# Patient Record
Sex: FEMALE | Race: WHITE | NOT HISPANIC OR LATINO | Employment: UNEMPLOYED | URBAN - METROPOLITAN AREA
[De-identification: names, ages, dates, MRNs, and addresses within clinical notes are randomized per-mention and may not be internally consistent; named-entity substitution may affect disease eponyms.]

---

## 2020-12-02 ENCOUNTER — OFFICE VISIT (OUTPATIENT)
Dept: OBSTETRICS AND GYNECOLOGY | Facility: CLINIC | Age: 35
End: 2020-12-02
Payer: OTHER GOVERNMENT

## 2020-12-02 ENCOUNTER — PATIENT MESSAGE (OUTPATIENT)
Dept: OBSTETRICS AND GYNECOLOGY | Facility: CLINIC | Age: 35
End: 2020-12-02

## 2020-12-02 ENCOUNTER — LAB VISIT (OUTPATIENT)
Dept: LAB | Facility: HOSPITAL | Age: 35
End: 2020-12-02
Attending: OBSTETRICS & GYNECOLOGY
Payer: OTHER GOVERNMENT

## 2020-12-02 VITALS
WEIGHT: 186.06 LBS | SYSTOLIC BLOOD PRESSURE: 100 MMHG | DIASTOLIC BLOOD PRESSURE: 68 MMHG | BODY MASS INDEX: 31 KG/M2 | HEIGHT: 65 IN

## 2020-12-02 DIAGNOSIS — N96 HISTORY OF RECURRENT MISCARRIAGES: ICD-10-CM

## 2020-12-02 DIAGNOSIS — Z32.00 POSSIBLE PREGNANCY: ICD-10-CM

## 2020-12-02 DIAGNOSIS — Z32.00 POSSIBLE PREGNANCY: Primary | ICD-10-CM

## 2020-12-02 LAB
ABO + RH BLD: NORMAL
BASOPHILS # BLD AUTO: 0.06 K/UL (ref 0–0.2)
BASOPHILS NFR BLD: 0.9 % (ref 0–1.9)
BLD GP AB SCN CELLS X3 SERPL QL: NORMAL
DIFFERENTIAL METHOD: NORMAL
EOSINOPHIL # BLD AUTO: 0.2 K/UL (ref 0–0.5)
EOSINOPHIL NFR BLD: 2.7 % (ref 0–8)
ERYTHROCYTE [DISTWIDTH] IN BLOOD BY AUTOMATED COUNT: 12 % (ref 11.5–14.5)
HCG INTACT+B SERPL-ACNC: 2.5 MIU/ML
HCT VFR BLD AUTO: 41 % (ref 37–48.5)
HGB BLD-MCNC: 13.4 G/DL (ref 12–16)
IMM GRANULOCYTES # BLD AUTO: 0.03 K/UL (ref 0–0.04)
IMM GRANULOCYTES NFR BLD AUTO: 0.5 % (ref 0–0.5)
LYMPHOCYTES # BLD AUTO: 1.6 K/UL (ref 1–4.8)
LYMPHOCYTES NFR BLD: 24.7 % (ref 18–48)
MCH RBC QN AUTO: 29.3 PG (ref 27–31)
MCHC RBC AUTO-ENTMCNC: 32.7 G/DL (ref 32–36)
MCV RBC AUTO: 90 FL (ref 82–98)
MONOCYTES # BLD AUTO: 0.4 K/UL (ref 0.3–1)
MONOCYTES NFR BLD: 6.9 % (ref 4–15)
NEUTROPHILS # BLD AUTO: 4.1 K/UL (ref 1.8–7.7)
NEUTROPHILS NFR BLD: 64.3 % (ref 38–73)
NRBC BLD-RTO: 0 /100 WBC
PLATELET # BLD AUTO: 219 K/UL (ref 150–350)
PMV BLD AUTO: 9.4 FL (ref 9.2–12.9)
RBC # BLD AUTO: 4.58 M/UL (ref 4–5.4)
WBC # BLD AUTO: 6.4 K/UL (ref 3.9–12.7)

## 2020-12-02 PROCEDURE — 36415 COLL VENOUS BLD VENIPUNCTURE: CPT

## 2020-12-02 PROCEDURE — 86901 BLOOD TYPING SEROLOGIC RH(D): CPT

## 2020-12-02 PROCEDURE — 85025 COMPLETE CBC W/AUTO DIFF WBC: CPT

## 2020-12-02 PROCEDURE — 99203 PR OFFICE/OUTPT VISIT, NEW, LEVL III, 30-44 MIN: ICD-10-PCS | Mod: S$PBB,,, | Performed by: OBSTETRICS & GYNECOLOGY

## 2020-12-02 PROCEDURE — 99203 OFFICE O/P NEW LOW 30 MIN: CPT | Mod: PBBFAC,25 | Performed by: OBSTETRICS & GYNECOLOGY

## 2020-12-02 PROCEDURE — 99999 PR PBB SHADOW E&M-NEW PATIENT-LVL III: CPT | Mod: PBBFAC,,, | Performed by: OBSTETRICS & GYNECOLOGY

## 2020-12-02 PROCEDURE — 99999 PR PBB SHADOW E&M-NEW PATIENT-LVL III: ICD-10-PCS | Mod: PBBFAC,,, | Performed by: OBSTETRICS & GYNECOLOGY

## 2020-12-02 PROCEDURE — 99203 OFFICE O/P NEW LOW 30 MIN: CPT | Mod: S$PBB,,, | Performed by: OBSTETRICS & GYNECOLOGY

## 2020-12-02 PROCEDURE — 84702 CHORIONIC GONADOTROPIN TEST: CPT

## 2020-12-02 RX ORDER — LEVOCETIRIZINE DIHYDROCHLORIDE 5 MG/1
TABLET, FILM COATED ORAL
COMMUNITY
Start: 2020-11-02 | End: 2023-01-11

## 2020-12-02 NOTE — PROGRESS NOTES
Subjective:       Patient ID: Columba Romano is a 35 y.o. female.    Chief Complaint:  Amenorrhea      History of Present Illness  HPI  Possible Pregnancy  Patient complains of recent menses that was much heavier and crampier than usual.  Took a pregnancy test on Thanksgiving day that returned with a positive result. Repeated the test yesterday and still had a faint positive result.  She believes she could be suffering another miscarriage.  Pt reports a history of seven prior first trimester SAB.  First two occurred with her first .  Last 5 have occurred with her current .  Had one successful and uneventful full term pregnancy in  with current .  First SAB was confirmed by MD, however, pt did not seek medical care with any of the other SAB.  Pt reports that she was confident that she was having an SAB due to symptoms and seeing positive home UPT each time.  Never saw the need to seek MD assistance.   Pregnancy is desired. Sexual Activity: single partner, contraception: none.  Pt reports no prior evaluation for the multiple SAB.  Last pap NILM in  or 2020 ([place]).  Menses are regular.  Denies excessive bleeding or cramping.      GYN & OB History  Patient's last menstrual period was 10/26/2020.   Date of Last Pap: No result found    OB History    Para Term  AB Living   8 1 1   7 1   SAB TAB Ectopic Multiple Live Births   7       1      # Outcome Date GA Lbr Juanpablo/2nd Weight Sex Delivery Anes PTL Lv   8 Term 14 40w0d  3.402 kg (7 lb 8 oz) F Vag-Spont   ARELI   7 SAB            6 SAB            5 SAB            4 SAB            3 SAB            2 SAB            1 SAB                Review of Systems  Review of Systems   Constitutional: Negative for activity change, appetite change, chills, fatigue, fever and unexpected weight change.   Respiratory: Negative for shortness of breath.    Cardiovascular: Negative for chest pain, palpitations and leg swelling.    Gastrointestinal: Positive for abdominal pain. Negative for bloating, blood in stool, constipation, diarrhea, nausea and vomiting.   Genitourinary: Positive for menstrual problem and vaginal bleeding. Negative for dysmenorrhea, dyspareunia, dysuria, flank pain, frequency, genital sores, hematuria, menorrhagia, pelvic pain, urgency, vaginal discharge, vaginal pain, urinary incontinence, postcoital bleeding, vaginal dryness and vaginal odor.   Musculoskeletal: Negative for back pain.   Neurological: Negative for syncope and headaches.           Objective:    Physical Exam:   Constitutional: She is oriented to person, place, and time. She appears well-developed and well-nourished. No distress.    HENT:   Head: Normocephalic and atraumatic.    Eyes: Pupils are equal, round, and reactive to light. EOM are normal.    Neck: Normal range of motion.    Cardiovascular: Normal rate and regular rhythm.     Pulmonary/Chest: Effort normal.        Abdominal: Soft. She exhibits no distension. There is no abdominal tenderness.     Genitourinary:    Uterus normal.      Pelvic exam was performed with patient supine.   There is no rash, tenderness, lesion or injury on the right labia. There is no rash, tenderness, lesion or injury on the left labia. Uterus is not deviated, not enlarged and not tender. Cervix is normal. Right adnexum displays no mass, no tenderness and no fullness. Left adnexum displays tenderness. Left adnexum displays no mass and no fullness. There is bleeding (scant amount old blood in vault) in the vagina. No erythema or tenderness in the vagina.    No foreign body in the vagina.      No signs of injury in the vagina.   Cervix exhibits no motion tenderness, no discharge and no friability.    Genitourinary Comments: UPT today Negative   negative for vaginal discharge          Musculoskeletal: Normal range of motion and moves all extremeties. No tenderness or edema.       Neurological: She is alert and oriented to  person, place, and time.    Skin: Skin is warm and dry.    Psychiatric: She has a normal mood and affect. Her behavior is normal. Thought content normal.          Assessment:        1. Possible pregnancy    2. History of recurrent miscarriages             Plan:      Possible pregnancy  -     POCT urine pregnancy  -     HCG, Quantitative; Future; Expected date: 12/02/2020  -     CBC Auto Differential; Future; Expected date: 12/02/2020  -     Type & Screen; Future  -     UPT today was negative.  Pt was counseled on different possibilities including possibility of false positive/negative results.  Thus, not able to confirm pregnancy or SAB at this time.  Will await serum HCG levels for confirmation.    History of recurrent miscarriages  -     First SAB has been the only verified SAB that pt had.  The remaining 6 SAB that pt reports are based off pt recollection of symptoms and home UPT results.  Pt was counseled on evaluation options.  Will await results of current episode and will consider initiating full evaluation once current episode has been cleared.      Follow up in about 6 weeks (around 1/13/2021).

## 2021-05-10 ENCOUNTER — PATIENT MESSAGE (OUTPATIENT)
Dept: RESEARCH | Facility: HOSPITAL | Age: 36
End: 2021-05-10

## 2021-12-20 ENCOUNTER — OFFICE VISIT (OUTPATIENT)
Dept: INTERNAL MEDICINE | Facility: CLINIC | Age: 36
End: 2021-12-20
Payer: OTHER GOVERNMENT

## 2021-12-20 ENCOUNTER — HOSPITAL ENCOUNTER (OUTPATIENT)
Dept: RADIOLOGY | Facility: HOSPITAL | Age: 36
Discharge: HOME | End: 2021-12-20
Attending: FAMILY MEDICINE
Payer: OTHER GOVERNMENT

## 2021-12-20 VITALS
HEART RATE: 81 BPM | HEIGHT: 65 IN | BODY MASS INDEX: 31.96 KG/M2 | SYSTOLIC BLOOD PRESSURE: 116 MMHG | RESPIRATION RATE: 18 BRPM | WEIGHT: 191.81 LBS | DIASTOLIC BLOOD PRESSURE: 72 MMHG | OXYGEN SATURATION: 98 %

## 2021-12-20 DIAGNOSIS — E66.9 OBESITY (BMI 30.0-34.9): ICD-10-CM

## 2021-12-20 DIAGNOSIS — K91.5 POST-CHOLECYSTECTOMY SYNDROME: ICD-10-CM

## 2021-12-20 DIAGNOSIS — R04.2 COUGH WITH HEMOPTYSIS: ICD-10-CM

## 2021-12-20 DIAGNOSIS — G93.31 POST-INFLUENZA SYNDROME: ICD-10-CM

## 2021-12-20 DIAGNOSIS — K21.9 GASTROESOPHAGEAL REFLUX DISEASE, UNSPECIFIED WHETHER ESOPHAGITIS PRESENT: ICD-10-CM

## 2021-12-20 DIAGNOSIS — G93.31 POST-INFLUENZA SYNDROME: Primary | ICD-10-CM

## 2021-12-20 PROBLEM — I35.1 AORTIC VALVE REGURGITATION: Status: ACTIVE | Noted: 2017-08-10

## 2021-12-20 PROBLEM — N60.19 FIBROCYSTIC DISEASE OF BREAST: Status: ACTIVE | Noted: 2017-06-01

## 2021-12-20 PROBLEM — Q23.81 BICUSPID AORTIC VALVE: Status: ACTIVE | Noted: 2017-06-01

## 2021-12-20 PROBLEM — M50.30 DDD (DEGENERATIVE DISC DISEASE), CERVICAL: Status: ACTIVE | Noted: 2017-06-09

## 2021-12-20 PROBLEM — E66.811 OBESITY (BMI 30.0-34.9): Status: ACTIVE | Noted: 2021-12-20

## 2021-12-20 PROBLEM — J30.2 SEASONAL ALLERGIC RHINITIS: Status: ACTIVE | Noted: 2017-06-01

## 2021-12-20 PROBLEM — Q23.1 BICUSPID AORTIC VALVE: Status: ACTIVE | Noted: 2017-06-01

## 2021-12-20 PROBLEM — N28.1 SIMPLE RENAL CYST: Status: ACTIVE | Noted: 2018-01-19

## 2021-12-20 PROBLEM — G43.109 MIGRAINE WITH AURA: Status: ACTIVE | Noted: 2018-12-06

## 2021-12-20 LAB
BASOPHILS # BLD AUTO: 0.05 K/UL (ref 0–0.2)
BASOPHILS NFR BLD: 0.7 % (ref 0–1.9)
DIFFERENTIAL METHOD: ABNORMAL
EOSINOPHIL # BLD AUTO: 0.3 K/UL (ref 0–0.5)
EOSINOPHIL NFR BLD: 3.5 % (ref 0–8)
ERYTHROCYTE [DISTWIDTH] IN BLOOD BY AUTOMATED COUNT: 12.4 % (ref 11.5–14.5)
HCT VFR BLD AUTO: 42.4 % (ref 37–48.5)
HGB BLD-MCNC: 12.9 G/DL (ref 12–16)
IMM GRANULOCYTES # BLD AUTO: 0.04 K/UL (ref 0–0.04)
IMM GRANULOCYTES NFR BLD AUTO: 0.6 % (ref 0–0.5)
LYMPHOCYTES # BLD AUTO: 1.6 K/UL (ref 1–4.8)
LYMPHOCYTES NFR BLD: 22.8 % (ref 18–48)
MCH RBC QN AUTO: 28.6 PG (ref 27–31)
MCHC RBC AUTO-ENTMCNC: 30.4 G/DL (ref 32–36)
MCV RBC AUTO: 94 FL (ref 82–98)
MONOCYTES # BLD AUTO: 0.7 K/UL (ref 0.3–1)
MONOCYTES NFR BLD: 9.8 % (ref 4–15)
NEUTROPHILS # BLD AUTO: 4.5 K/UL (ref 1.8–7.7)
NEUTROPHILS NFR BLD: 62.6 % (ref 38–73)
NRBC BLD-RTO: 0 /100 WBC
PLATELET # BLD AUTO: 236 K/UL (ref 150–450)
PMV BLD AUTO: 11.1 FL (ref 9.2–12.9)
RBC # BLD AUTO: 4.51 M/UL (ref 4–5.4)
WBC # BLD AUTO: 7.12 K/UL (ref 3.9–12.7)

## 2021-12-20 PROCEDURE — 85025 COMPLETE CBC W/AUTO DIFF WBC: CPT | Performed by: FAMILY MEDICINE

## 2021-12-20 PROCEDURE — 99203 OFFICE O/P NEW LOW 30 MIN: CPT | Mod: S$PBB,,, | Performed by: FAMILY MEDICINE

## 2021-12-20 PROCEDURE — 71046 X-RAY EXAM CHEST 2 VIEWS: CPT | Mod: TC

## 2021-12-20 PROCEDURE — 99999 PR PBB SHADOW E&M-EST. PATIENT-LVL IV: CPT | Mod: PBBFAC,,, | Performed by: FAMILY MEDICINE

## 2021-12-20 PROCEDURE — 99214 OFFICE O/P EST MOD 30 MIN: CPT | Mod: PBBFAC,25 | Performed by: FAMILY MEDICINE

## 2021-12-20 PROCEDURE — 99999 PR PBB SHADOW E&M-EST. PATIENT-LVL IV: ICD-10-PCS | Mod: PBBFAC,,, | Performed by: FAMILY MEDICINE

## 2021-12-20 PROCEDURE — 71046 X-RAY EXAM CHEST 2 VIEWS: CPT | Mod: 26,,, | Performed by: RADIOLOGY

## 2021-12-20 PROCEDURE — 71046 XR CHEST PA AND LATERAL: ICD-10-PCS | Mod: 26,,, | Performed by: RADIOLOGY

## 2021-12-20 PROCEDURE — 99203 PR OFFICE/OUTPT VISIT, NEW, LEVL III, 30-44 MIN: ICD-10-PCS | Mod: S$PBB,,, | Performed by: FAMILY MEDICINE

## 2021-12-20 RX ORDER — AMOXICILLIN 875 MG/1
TABLET, FILM COATED ORAL
COMMUNITY
Start: 2021-07-03 | End: 2021-12-20

## 2021-12-20 RX ORDER — HYDROCODONE BITARTRATE AND ACETAMINOPHEN 7.5; 325 MG/1; MG/1
1 TABLET ORAL
COMMUNITY
Start: 2021-07-05 | End: 2021-12-20

## 2021-12-20 RX ORDER — DIPHENOXYLATE HYDROCHLORIDE AND ATROPINE SULFATE 2.5; .025 MG/1; MG/1
1 TABLET ORAL 4 TIMES DAILY
COMMUNITY
Start: 2021-08-26 | End: 2022-01-13

## 2021-12-20 RX ORDER — ALBUTEROL SULFATE 90 UG/1
2 AEROSOL, METERED RESPIRATORY (INHALATION) EVERY 6 HOURS PRN
Qty: 18 G | Refills: 0 | Status: SHIPPED | OUTPATIENT
Start: 2021-12-20 | End: 2022-01-15

## 2021-12-20 RX ORDER — BALOXAVIR MARBOXIL 80 MG/1
TABLET, FILM COATED ORAL
COMMUNITY
Start: 2021-12-09 | End: 2021-12-20 | Stop reason: ALTCHOICE

## 2021-12-20 RX ORDER — BENZONATATE 200 MG/1
200 CAPSULE ORAL 3 TIMES DAILY PRN
Qty: 30 CAPSULE | Refills: 0 | Status: SHIPPED | OUTPATIENT
Start: 2021-12-20 | End: 2021-12-30

## 2021-12-20 RX ORDER — FAMOTIDINE 40 MG/1
40 TABLET, FILM COATED ORAL DAILY
COMMUNITY
End: 2022-01-13 | Stop reason: ALTCHOICE

## 2021-12-20 RX ORDER — TRAMADOL HYDROCHLORIDE 50 MG/1
50 TABLET ORAL EVERY 6 HOURS PRN
COMMUNITY
Start: 2021-07-03 | End: 2021-12-20

## 2021-12-20 RX ORDER — PREDNISONE 20 MG/1
40 TABLET ORAL DAILY
COMMUNITY
Start: 2021-12-09 | End: 2021-12-20

## 2022-01-11 DIAGNOSIS — R04.2 COUGH WITH HEMOPTYSIS: ICD-10-CM

## 2022-01-11 NOTE — TELEPHONE ENCOUNTER
No new care gaps identified.  Powered by [employer]. Reference number: [id number].   1/11/2022 3:41:28 AM CST

## 2022-01-13 ENCOUNTER — OFFICE VISIT (OUTPATIENT)
Dept: INTERNAL MEDICINE | Facility: CLINIC | Age: 37
End: 2022-01-13
Payer: OTHER GOVERNMENT

## 2022-01-13 VITALS
DIASTOLIC BLOOD PRESSURE: 70 MMHG | OXYGEN SATURATION: 98 % | TEMPERATURE: 100 F | WEIGHT: 193.56 LBS | HEART RATE: 96 BPM | SYSTOLIC BLOOD PRESSURE: 112 MMHG | BODY MASS INDEX: 32.21 KG/M2

## 2022-01-13 DIAGNOSIS — Q23.1 BICUSPID AORTIC VALVE: ICD-10-CM

## 2022-01-13 DIAGNOSIS — I35.1 AORTIC VALVE INSUFFICIENCY, ETIOLOGY OF CARDIAC VALVE DISEASE UNSPECIFIED: ICD-10-CM

## 2022-01-13 DIAGNOSIS — E66.9 OBESITY (BMI 30.0-34.9): ICD-10-CM

## 2022-01-13 DIAGNOSIS — Z01.419 WELL WOMAN EXAM: ICD-10-CM

## 2022-01-13 DIAGNOSIS — K21.9 GASTROESOPHAGEAL REFLUX DISEASE, UNSPECIFIED WHETHER ESOPHAGITIS PRESENT: ICD-10-CM

## 2022-01-13 DIAGNOSIS — G43.119 INTRACTABLE MIGRAINE WITH AURA WITHOUT STATUS MIGRAINOSUS: ICD-10-CM

## 2022-01-13 DIAGNOSIS — Z00.00 ROUTINE GENERAL MEDICAL EXAMINATION AT A HEALTH CARE FACILITY: Primary | ICD-10-CM

## 2022-01-13 DIAGNOSIS — J30.1 SEASONAL ALLERGIC RHINITIS DUE TO POLLEN: ICD-10-CM

## 2022-01-13 DIAGNOSIS — R25.3 FREQUENT FASCICULATION OF MUSCLE OF EXTREMITY: ICD-10-CM

## 2022-01-13 LAB
ALBUMIN SERPL BCP-MCNC: 3.9 G/DL (ref 3.5–5.2)
ALP SERPL-CCNC: 78 U/L (ref 55–135)
ALT SERPL W/O P-5'-P-CCNC: 41 U/L (ref 10–44)
ANION GAP SERPL CALC-SCNC: 11 MMOL/L (ref 8–16)
AST SERPL-CCNC: 32 U/L (ref 10–40)
BILIRUB SERPL-MCNC: 0.3 MG/DL (ref 0.1–1)
BILIRUB UR QL STRIP: NEGATIVE
BUN SERPL-MCNC: 10 MG/DL (ref 6–20)
CALCIUM SERPL-MCNC: 9.5 MG/DL (ref 8.7–10.5)
CHLORIDE SERPL-SCNC: 106 MMOL/L (ref 95–110)
CHOLEST SERPL-MCNC: 211 MG/DL (ref 120–199)
CHOLEST/HDLC SERPL: 4.4 {RATIO} (ref 2–5)
CK SERPL-CCNC: 98 U/L (ref 20–180)
CLARITY UR: CLEAR
CO2 SERPL-SCNC: 21 MMOL/L (ref 23–29)
COLOR UR: YELLOW
CREAT SERPL-MCNC: 0.7 MG/DL (ref 0.5–1.4)
EST. GFR  (AFRICAN AMERICAN): >60 ML/MIN/1.73 M^2
EST. GFR  (NON AFRICAN AMERICAN): >60 ML/MIN/1.73 M^2
GLUCOSE SERPL-MCNC: 79 MG/DL (ref 70–110)
GLUCOSE UR QL STRIP: NEGATIVE
HDLC SERPL-MCNC: 48 MG/DL (ref 40–75)
HDLC SERPL: 22.7 % (ref 20–50)
HGB UR QL STRIP: NEGATIVE
KETONES UR QL STRIP: NEGATIVE
LDLC SERPL CALC-MCNC: 88.6 MG/DL (ref 63–159)
LEUKOCYTE ESTERASE UR QL STRIP: NEGATIVE
NITRITE UR QL STRIP: NEGATIVE
NONHDLC SERPL-MCNC: 163 MG/DL
PH UR STRIP: 7 [PH] (ref 5–8)
POTASSIUM SERPL-SCNC: 3.8 MMOL/L (ref 3.5–5.1)
PROT SERPL-MCNC: 6.9 G/DL (ref 6–8.4)
PROT UR QL STRIP: NEGATIVE
SODIUM SERPL-SCNC: 138 MMOL/L (ref 136–145)
SP GR UR STRIP: 1.02 (ref 1–1.03)
TRIGL SERPL-MCNC: 372 MG/DL (ref 30–150)
TSH SERPL DL<=0.005 MIU/L-ACNC: 1 UIU/ML (ref 0.4–4)
URN SPEC COLLECT METH UR: NORMAL

## 2022-01-13 PROCEDURE — 87389 HIV-1 AG W/HIV-1&-2 AB AG IA: CPT | Performed by: FAMILY MEDICINE

## 2022-01-13 PROCEDURE — 99395 PR PREVENTIVE VISIT,EST,18-39: ICD-10-PCS | Mod: S$PBB,,, | Performed by: FAMILY MEDICINE

## 2022-01-13 PROCEDURE — 80061 LIPID PANEL: CPT | Performed by: FAMILY MEDICINE

## 2022-01-13 PROCEDURE — 84443 ASSAY THYROID STIM HORMONE: CPT | Performed by: FAMILY MEDICINE

## 2022-01-13 PROCEDURE — 99999 PR PBB SHADOW E&M-EST. PATIENT-LVL V: CPT | Mod: PBBFAC,,, | Performed by: FAMILY MEDICINE

## 2022-01-13 PROCEDURE — 81003 URINALYSIS AUTO W/O SCOPE: CPT | Performed by: FAMILY MEDICINE

## 2022-01-13 PROCEDURE — 99215 OFFICE O/P EST HI 40 MIN: CPT | Mod: PBBFAC | Performed by: FAMILY MEDICINE

## 2022-01-13 PROCEDURE — 99395 PREV VISIT EST AGE 18-39: CPT | Mod: S$PBB,,, | Performed by: FAMILY MEDICINE

## 2022-01-13 PROCEDURE — 86803 HEPATITIS C AB TEST: CPT | Performed by: FAMILY MEDICINE

## 2022-01-13 PROCEDURE — 80053 COMPREHEN METABOLIC PANEL: CPT | Performed by: FAMILY MEDICINE

## 2022-01-13 PROCEDURE — 99999 PR PBB SHADOW E&M-EST. PATIENT-LVL V: ICD-10-PCS | Mod: PBBFAC,,, | Performed by: FAMILY MEDICINE

## 2022-01-13 PROCEDURE — 82550 ASSAY OF CK (CPK): CPT | Performed by: FAMILY MEDICINE

## 2022-01-13 PROCEDURE — 85025 COMPLETE CBC W/AUTO DIFF WBC: CPT | Performed by: FAMILY MEDICINE

## 2022-01-13 RX ORDER — OMEPRAZOLE 40 MG/1
40 CAPSULE, DELAYED RELEASE ORAL DAILY
Qty: 30 CAPSULE | Refills: 11 | Status: SHIPPED | OUTPATIENT
Start: 2022-01-13 | End: 2023-03-30

## 2022-01-13 NOTE — PROGRESS NOTES
Subjective:       Patient ID: Columba Romano is a 36 y.o. female.    Chief Complaint: ER follow-up, extremity problem, and vision changes    36-year-old female patient with Patient Active Problem List:     Gastroesophageal reflux disease     Post-cholecystectomy syndrome     Obesity (BMI 30.0-34.9)     Bicuspid aortic valve     Aortic valve regurgitation     DDD (degenerative disc disease), cervical     Fibrocystic disease of breast     Migraine with aura     Seasonal allergic rhinitis     Simple renal cyst  Here for routine annual physicals and reports that she went to ER at our [facility] secondary to chest pain and had complete evaluation secondary to mildly elevation D-dimer with CTA which was normal.  Patient reported that she was having chest pain with throat discomfort, and never had similar symptoms like this before.  Secondary to acid reflux has been taking famotidine 40 mg daily for the past 2 years.  Denies any abdominal discomfort nausea vomiting or chest tightness lately but would like to see a gastroenterologist for further evaluation.  Patient has moved from [place] and reported that she had EGD and colonoscopy done couple of years ago by her gastroenterologist was advised to repeat EGD again.   Patient has been having muscle tremors to both the lower legs off and on lately and secondary to unexpected falls, headache and vision disturbances had seen neurologist in [place] and had MRI of the brain which did not show any acute findings and reported that she had nerve conduction study done 5 years ago which was normal.  Continues to have muscle tremors to bilateral legs and reports recent fall.       Review of Systems   Constitutional: Negative for activity change and unexpected weight change.   HENT: Negative for hearing loss, rhinorrhea and trouble swallowing.    Eyes: Positive for visual disturbance. Negative for discharge.   Respiratory: Negative for chest tightness and wheezing.    Cardiovascular:  Negative for chest pain and palpitations.   Gastrointestinal: Negative for blood in stool, constipation, diarrhea and vomiting.   Endocrine: Negative for polydipsia and polyuria.   Genitourinary: Negative for difficulty urinating, dysuria, hematuria and menstrual problem.   Musculoskeletal: Positive for arthralgias and myalgias. Negative for joint swelling and neck pain.   Neurological: Positive for headaches. Negative for weakness.   Psychiatric/Behavioral: Negative for confusion and dysphoric mood.         /70 (BP Location: Left arm, Patient Position: Sitting, BP Method: Large (Manual))   Pulse 96   Temp 99.7 °F (37.6 °C) (Tympanic)   Wt 87.8 kg (193 lb 9 oz)   LMP 12/22/2021 (Exact Date)   SpO2 98%   BMI 32.21 kg/m²   Objective:      Physical Exam  Constitutional:       Appearance: She is well-developed and well-nourished.   HENT:      Head: Normocephalic and atraumatic.      Mouth/Throat:      Mouth: Oropharynx is clear and moist.   Cardiovascular:      Rate and Rhythm: Normal rate and regular rhythm.      Heart sounds: Normal heart sounds. No murmur heard.      Pulmonary:      Effort: Pulmonary effort is normal.      Breath sounds: Normal breath sounds. No wheezing.   Chest:      Chest wall: No tenderness.   Abdominal:      General: Bowel sounds are normal.      Palpations: Abdomen is soft.      Tenderness: There is no abdominal tenderness.   Musculoskeletal:         General: No tenderness or edema.   Skin:     General: Skin is warm and dry.      Findings: No rash.   Neurological:      Mental Status: She is alert and oriented to person, place, and time.   Psychiatric:         Mood and Affect: Mood and affect and mood normal.           Assessment/Plan:   1. Routine general medical examination at a health care facility  - CBC Auto Differential  - Comprehensive Metabolic Panel  - Lipid Panel  - TSH  - Urinalysis, Reflex to Urine Culture Urine, Clean Catch  - Hepatitis C Antibody  - HIV 1/2 Ag/Ab (4th  Gen)  Vital signs stable today.  Clinical exam stable  Continue lifestyle modifications with low-fat and low-cholesterol diet and exercise 30 minutes daily  Did not get flu shot this year    2. Gastroesophageal reflux disease, unspecified whether esophagitis present  - omeprazole (PRILOSEC) 40 MG capsule; Take 1 capsule (40 mg total) by mouth once daily.  Dispense: 30 capsule; Refill: 11  - Ambulatory referral/consult to Gastroenterology; Future  Will consider changing famotidine to omeprazole 40 mg and will refer to Gastroenterology  Reviewed ER workup through care everywhere which was normal  Encouraged to eat small frequent meals and avoid spicy diet    3. Bicuspid aortic valve  4. Aortic valve insufficiency, etiology of cardiac valve disease unspecified  Followed by Cardiology Dr. land    5. Intractable migraine with aura without status migrainosus    6. Seasonal allergic rhinitis due to pollen    7. Obesity (BMI 30.0-34.9)  Lifestyle modifications discussed to lose weight with BMI 32    8. Frequent fasciculation of muscle of extremity  - Ambulatory referral/consult to Neurology  - EMG W/ ULTRASOUND AND NERVE CONDUCTION TEST 2 Extremities; Future  - CK  Will get nerve conduction study and will refer to Neurology to rule out neuromuscular diseases    9. Well woman exam  - Ambulatory referral/consult to Obstetrics / Gynecology; Future   Due for Pap smear

## 2022-01-14 LAB
BASOPHILS # BLD AUTO: 0.06 K/UL (ref 0–0.2)
BASOPHILS NFR BLD: 0.9 % (ref 0–1.9)
DIFFERENTIAL METHOD: ABNORMAL
EOSINOPHIL # BLD AUTO: 0.2 K/UL (ref 0–0.5)
EOSINOPHIL NFR BLD: 2.6 % (ref 0–8)
ERYTHROCYTE [DISTWIDTH] IN BLOOD BY AUTOMATED COUNT: 12.7 % (ref 11.5–14.5)
HCT VFR BLD AUTO: 42.6 % (ref 37–48.5)
HCV AB SERPL QL IA: NEGATIVE
HGB BLD-MCNC: 13.4 G/DL (ref 12–16)
HIV 1+2 AB+HIV1 P24 AG SERPL QL IA: NEGATIVE
IMM GRANULOCYTES # BLD AUTO: 0.02 K/UL (ref 0–0.04)
IMM GRANULOCYTES NFR BLD AUTO: 0.3 % (ref 0–0.5)
LYMPHOCYTES # BLD AUTO: 1.9 K/UL (ref 1–4.8)
LYMPHOCYTES NFR BLD: 27.2 % (ref 18–48)
MCH RBC QN AUTO: 29.5 PG (ref 27–31)
MCHC RBC AUTO-ENTMCNC: 31.5 G/DL (ref 32–36)
MCV RBC AUTO: 94 FL (ref 82–98)
MONOCYTES # BLD AUTO: 0.6 K/UL (ref 0.3–1)
MONOCYTES NFR BLD: 8.1 % (ref 4–15)
NEUTROPHILS # BLD AUTO: 4.3 K/UL (ref 1.8–7.7)
NEUTROPHILS NFR BLD: 60.9 % (ref 38–73)
NRBC BLD-RTO: 0 /100 WBC
PLATELET # BLD AUTO: 250 K/UL (ref 150–450)
PMV BLD AUTO: 11.4 FL (ref 9.2–12.9)
RBC # BLD AUTO: 4.54 M/UL (ref 4–5.4)
WBC # BLD AUTO: 7.02 K/UL (ref 3.9–12.7)

## 2022-01-15 RX ORDER — ALBUTEROL SULFATE 90 UG/1
AEROSOL, METERED RESPIRATORY (INHALATION)
Qty: 54 G | Refills: 3 | Status: SHIPPED | OUTPATIENT
Start: 2022-01-15 | End: 2023-01-24

## 2022-01-16 NOTE — TELEPHONE ENCOUNTER
Refill Routing Note   Medication(s) are not appropriate for processing by [facility] for the following reason(s):      - Medication is a new start (<3 months)    [facility] action(s):  Defer          --->Care Gap information included in message below if applicable.   Medication reconciliation completed: No   Automatic Epic Generated Protocol Data:        Requested Prescriptions   Pending Prescriptions Disp Refills    albuterol (PROVENTIL/VENTOLIN HFA) 90 mcg/actuation inhaler [Pharmacy Med Name: ALBUTEROL HFA INH(200 PUFFS)18GM] 54 g 3     Sig: INHALE 2 PUFFS INTO THE LUNGS EVERY 6 HOURS AS NEEDED FOR WHEEZING( RESCUE)       Pulmonology:  Beta [facility]ists Passed - 1/11/2022  3:41 AM        Passed - Patient is at least 18 years old        Passed - Negative Pregnancy Status Check        Passed - Last Heart Rate in normal range within 360 days     Pulse Readings from Last 1 Encounters:   01/13/22 96              Passed - Valid encounter within last 15 months     Recent Visits  Date Type Provider Dept   01/13/22 Office Visit Joan Forrester MD [facility]   12/20/21 Office Visit Joan Forrester MD [facility]   Showing recent visits within past 720 days and meeting all other requirements  Future Appointments  No visits were found meeting these conditions.  Showing future appointments within next 150 days and meeting all other requirements                      Appointments  past 12m or future 3m with PCP    Date Provider   Last Visit   12/20/2021 Joan Forrester MD   Next Visit   1/13/2022 Joan Forrester MD   ED visits in past 90 days: 0        Note composed:8:00 PM 01/15/2022

## 2022-01-17 ENCOUNTER — PATIENT MESSAGE (OUTPATIENT)
Dept: INTERNAL MEDICINE | Facility: CLINIC | Age: 37
End: 2022-01-17
Payer: OTHER GOVERNMENT

## 2022-01-27 ENCOUNTER — TELEPHONE (OUTPATIENT)
Dept: OBSTETRICS AND GYNECOLOGY | Facility: CLINIC | Age: 37
End: 2022-01-27
Payer: OTHER GOVERNMENT

## 2022-03-10 ENCOUNTER — PATIENT OUTREACH (OUTPATIENT)
Dept: ADMINISTRATIVE | Facility: HOSPITAL | Age: 37
End: 2022-03-10
Payer: OTHER GOVERNMENT

## 2022-09-02 ENCOUNTER — PATIENT MESSAGE (OUTPATIENT)
Dept: INTERNAL MEDICINE | Facility: CLINIC | Age: 37
End: 2022-09-02
Payer: OTHER GOVERNMENT

## 2022-09-06 ENCOUNTER — OFFICE VISIT (OUTPATIENT)
Dept: INTERNAL MEDICINE | Facility: CLINIC | Age: 37
End: 2022-09-06
Payer: OTHER GOVERNMENT

## 2022-09-06 ENCOUNTER — OFFICE VISIT (OUTPATIENT)
Dept: OTOLARYNGOLOGY | Facility: CLINIC | Age: 37
End: 2022-09-06
Payer: OTHER GOVERNMENT

## 2022-09-06 VITALS — DIASTOLIC BLOOD PRESSURE: 78 MMHG | SYSTOLIC BLOOD PRESSURE: 119 MMHG | HEART RATE: 90 BPM | TEMPERATURE: 98 F

## 2022-09-06 DIAGNOSIS — J30.1 SEASONAL ALLERGIC RHINITIS DUE TO POLLEN: ICD-10-CM

## 2022-09-06 DIAGNOSIS — R49.0 HOARSENESS: ICD-10-CM

## 2022-09-06 DIAGNOSIS — R06.83 SNORING: Primary | ICD-10-CM

## 2022-09-06 DIAGNOSIS — H92.09 OTALGIA, UNSPECIFIED LATERALITY: Primary | ICD-10-CM

## 2022-09-06 DIAGNOSIS — R09.82 PND (POST-NASAL DRIP): ICD-10-CM

## 2022-09-06 PROCEDURE — 99999 PR PBB SHADOW E&M-EST. PATIENT-LVL III: ICD-10-PCS | Mod: PBBFAC,,, | Performed by: PHYSICIAN ASSISTANT

## 2022-09-06 PROCEDURE — 99213 OFFICE O/P EST LOW 20 MIN: CPT | Mod: 95,,, | Performed by: FAMILY MEDICINE

## 2022-09-06 PROCEDURE — 99203 OFFICE O/P NEW LOW 30 MIN: CPT | Mod: S$PBB,,, | Performed by: PHYSICIAN ASSISTANT

## 2022-09-06 PROCEDURE — 99213 PR OFFICE/OUTPT VISIT, EST, LEVL III, 20-29 MIN: ICD-10-PCS | Mod: 95,,, | Performed by: FAMILY MEDICINE

## 2022-09-06 PROCEDURE — 99999 PR PBB SHADOW E&M-EST. PATIENT-LVL III: CPT | Mod: PBBFAC,,, | Performed by: PHYSICIAN ASSISTANT

## 2022-09-06 PROCEDURE — 99213 OFFICE O/P EST LOW 20 MIN: CPT | Mod: PBBFAC | Performed by: PHYSICIAN ASSISTANT

## 2022-09-06 PROCEDURE — 99203 PR OFFICE/OUTPT VISIT, NEW, LEVL III, 30-44 MIN: ICD-10-PCS | Mod: S$PBB,,, | Performed by: PHYSICIAN ASSISTANT

## 2022-09-06 NOTE — PROGRESS NOTES
Subjective:   Patient ID: Columba Romano is a 37 y.o. female.    Chief Complaint: Other (Reflux, sinuses, ear pain, chronic cough X 1 year )    Ms. Romano is a very pleasant 38 yo femael here to see me today for multiple reasons. She moved from [place] 2 years ago and is in the process of establishing care with ENT, GI and Allergy. She has a h/o LRD and is managed with omeprazole. Since she has moved, she has been having constant allergy issues, nasal congestion,  PND, sore throat with frequent hoarseness. She is a smoker.     Review of patient's allergies indicates:   Allergen Reactions    Diphenhydramine (bulk) Other (See Comments)     Restless legs    Meperidine Itching and Anxiety           Review of Systems   Constitutional:  Negative for chills, fatigue, fever and unexpected weight change.   HENT:  Positive for congestion, ear pain, rhinorrhea, sore throat and voice change. Negative for dental problem, ear discharge, facial swelling, hearing loss, nosebleeds, postnasal drip, sinus pressure, sneezing, tinnitus and trouble swallowing.    Eyes:  Negative for redness, itching and visual disturbance.   Respiratory:  Negative for cough, choking, shortness of breath and wheezing.    Cardiovascular:  Negative for chest pain and palpitations.   Gastrointestinal:  Negative for abdominal pain.        No reflux.   Musculoskeletal:  Negative for gait problem.   Skin:  Negative for rash.   Allergic/Immunologic: Positive for environmental allergies.   Neurological:  Negative for dizziness, light-headedness and headaches.       Objective:   /78   Pulse 90   Temp 98.1 °F (36.7 °C) (Temporal)     Physical Exam  Constitutional:       General: She is not in acute distress.     Appearance: She is well-developed.   HENT:      Head: Normocephalic and atraumatic.      Right Ear: Tympanic membrane, ear canal and external ear normal.      Left Ear: Tympanic membrane, ear canal and external ear normal.      Nose: Nose normal.  No nasal deformity, septal deviation, mucosal edema or rhinorrhea.      Right Sinus: No maxillary sinus tenderness or frontal sinus tenderness.      Left Sinus: No maxillary sinus tenderness or frontal sinus tenderness.      Mouth/Throat:      Mouth: Mucous membranes are not pale and not dry.      Dentition: No dental caries.      Pharynx: Uvula midline. No oropharyngeal exudate or posterior oropharyngeal erythema.   Eyes:      General: Lids are normal. No scleral icterus.     Extraocular Movements:      Right eye: Normal extraocular motion and no nystagmus.      Left eye: Normal extraocular motion and no nystagmus.      Conjunctiva/sclera: Conjunctivae normal.      Right eye: Right conjunctiva is not injected. No chemosis.     Left eye: Left conjunctiva is not injected. No chemosis.     Pupils: Pupils are equal, round, and reactive to light.   Neck:      Thyroid: No thyroid mass or thyromegaly.      Trachea: Trachea and phonation normal. No tracheal tenderness or tracheal deviation.   Pulmonary:      Effort: Pulmonary effort is normal. No respiratory distress.      Breath sounds: No stridor.   Abdominal:      General: There is no distension.   Lymphadenopathy:      Head:      Right side of head: No submental, submandibular, preauricular, posterior auricular or occipital adenopathy.      Left side of head: No submental, submandibular, preauricular, posterior auricular or occipital adenopathy.      Cervical: No cervical adenopathy.   Skin:     General: Skin is warm and dry.      Findings: No erythema or rash.   Neurological:      Mental Status: She is alert and oriented to person, place, and time.      Cranial Nerves: No cranial nerve deficit.   Psychiatric:         Behavior: Behavior normal.          Assessment:     1. Otalgia, unspecified laterality    2. PND (post-nasal drip)    3. Hoarseness        Plan:     Otalgia, unspecified laterality    PND (post-nasal drip)    Hoarseness    Other orders  -     fluticasone  (VERAMYST) 27.5 mcg/actuation nasal spray; 2 sprays by Nasal route once daily.  Dispense: 9.1 mL; Refill: 6      We discussed restarting Flonase for PND and thought ETD. She will also continue frequent saline flushes. We have scheduled her to return to clinic for a scope evaluation. She has appointments already scheduled for Allergy as well as GI.

## 2022-09-06 NOTE — PROGRESS NOTES
The patient location is: Home  The chief complaint leading to consultation is: Snoring    Visit type: audiovisual    Face to Face time with patient:   15 minutes of total time spent on the encounter, which includes face to face time and non-face to face time preparing to see the patient (eg, review of tests), Obtaining and/or reviewing separately obtained history, Documenting clinical information in the electronic or other health record, Independently interpreting results (not separately reported) and communicating results to the patient/family/caregiver, or Care coordination (not separately reported).         Each patient to whom he or she provides medical services by telemedicine is:  (1) informed of the relationship between the physician and patient and the respective role of any other health care provider with respect to management of the patient; and (2) notified that he or she may decline to receive medical services by telemedicine and may withdraw from such care at any time.    Notes:      Subjective:       Patient ID: Columba Romano is a 37 y.o. female.    Chief Complaint: No chief complaint on file.    37-year-old female patient with Patient Active Problem List:     Gastroesophageal reflux disease     Post-cholecystectomy syndrome     Obesity (BMI 30.0-34.9)     Bicuspid aortic valve     Aortic valve regurgitation     DDD (degenerative disc disease), cervical     Fibrocystic disease of breast     Migraine with aura     Seasonal allergic rhinitis     Simple renal cyst  Reports that she has been snoring a lot lately and has been having early morning fatigue, patient had SVT in the past with Holter monitor and was advised by her cardiologist to get tested for sleep apnea in [place] before she moved here.   Patient would like to get further testing and reports that occasionally she has been having dry to productive cough and would like to see pulmonologist for further evaluation  Does suffer from allergies  for which patient had seen ENT today    Review of Systems   Constitutional:  Positive for fatigue. Negative for activity change and unexpected weight change.   HENT:  Negative for hearing loss, rhinorrhea and trouble swallowing.    Eyes:  Negative for discharge and visual disturbance.   Respiratory:  Positive for cough. Negative for chest tightness, shortness of breath and wheezing.    Cardiovascular:  Negative for chest pain, palpitations and leg swelling.   Gastrointestinal:  Negative for abdominal pain, blood in stool, constipation, diarrhea, nausea and vomiting.   Endocrine: Negative for polydipsia and polyuria.   Genitourinary:  Negative for difficulty urinating, dysuria, hematuria and menstrual problem.   Musculoskeletal:  Negative for arthralgias, joint swelling, myalgias and neck pain.   Skin:  Negative for rash.   Neurological:  Negative for weakness, light-headedness and headaches.   Psychiatric/Behavioral:  Positive for sleep disturbance. Negative for confusion and dysphoric mood.        There were no vitals taken for this visit.  Objective:      Physical Exam  Pulmonary:      Effort: No respiratory distress.   Neurological:      Mental Status: She is alert and oriented to person, place, and time.   Psychiatric:         Mood and Affect: Mood normal.         Assessment/Plan:   1. Snoring  - Ambulatory referral/consult to Pulmonology; Future  Will refer to pulmonology for further evaluation for sleep apnea as requested by patient     Seasonal allergies-  Patient was seen by ENT and was advised to continue using prescriptions as prescribed today

## 2022-09-12 ENCOUNTER — OFFICE VISIT (OUTPATIENT)
Dept: OTOLARYNGOLOGY | Facility: CLINIC | Age: 37
End: 2022-09-12
Payer: OTHER GOVERNMENT

## 2022-09-12 VITALS — HEART RATE: 78 BPM | TEMPERATURE: 99 F | DIASTOLIC BLOOD PRESSURE: 80 MMHG | SYSTOLIC BLOOD PRESSURE: 114 MMHG

## 2022-09-12 DIAGNOSIS — J34.2 NASAL SEPTAL DEVIATION: ICD-10-CM

## 2022-09-12 DIAGNOSIS — J34.3 HYPERTROPHY OF INFERIOR NASAL TURBINATE: ICD-10-CM

## 2022-09-12 DIAGNOSIS — K21.9 LARYNGOPHARYNGEAL REFLUX (LPR): Primary | ICD-10-CM

## 2022-09-12 DIAGNOSIS — M95.0 NASAL VALVE COLLAPSE: ICD-10-CM

## 2022-09-12 DIAGNOSIS — J30.89 NON-SEASONAL ALLERGIC RHINITIS, UNSPECIFIED TRIGGER: ICD-10-CM

## 2022-09-12 PROCEDURE — 31575 DIAGNOSTIC LARYNGOSCOPY: CPT | Mod: PBBFAC | Performed by: OTOLARYNGOLOGY

## 2022-09-12 PROCEDURE — 99214 OFFICE O/P EST MOD 30 MIN: CPT | Mod: 25,S$PBB,, | Performed by: OTOLARYNGOLOGY

## 2022-09-12 PROCEDURE — 99214 PR OFFICE/OUTPT VISIT, EST, LEVL IV, 30-39 MIN: ICD-10-PCS | Mod: 25,S$PBB,, | Performed by: OTOLARYNGOLOGY

## 2022-09-12 PROCEDURE — 99999 PR PBB SHADOW E&M-EST. PATIENT-LVL III: CPT | Mod: PBBFAC,,, | Performed by: OTOLARYNGOLOGY

## 2022-09-12 PROCEDURE — 99999 PR PBB SHADOW E&M-EST. PATIENT-LVL III: ICD-10-PCS | Mod: PBBFAC,,, | Performed by: OTOLARYNGOLOGY

## 2022-09-12 PROCEDURE — 31575 PR LARYNGOSCOPY, FLEXIBLE; DIAGNOSTIC: ICD-10-PCS | Mod: S$PBB,,, | Performed by: OTOLARYNGOLOGY

## 2022-09-12 PROCEDURE — 99213 OFFICE O/P EST LOW 20 MIN: CPT | Mod: PBBFAC,25 | Performed by: OTOLARYNGOLOGY

## 2022-09-12 PROCEDURE — 31575 DIAGNOSTIC LARYNGOSCOPY: CPT | Mod: S$PBB,,, | Performed by: OTOLARYNGOLOGY

## 2022-09-12 RX ORDER — OMEPRAZOLE 40 MG/1
40 CAPSULE, DELAYED RELEASE ORAL
Qty: 60 CAPSULE | Refills: 5 | Status: SHIPPED | OUTPATIENT
Start: 2022-09-12 | End: 2023-01-11

## 2022-09-12 NOTE — PROGRESS NOTES
Subjective:   Patient ID: Columba Romano is a 37 y.o. female.    Chief Complaint: Follow-up (Voice change, heart burn, sinus infections, cough, ear infections constantly)    Ms. Romano is a very pleasant 36 yo female here to see me today for multiple reasons. She moved from [place] 2 years ago and is in the process of establishing care with ENT, GI and Allergy. She has a h/o LRD and is managed with omeprazole. Since she has moved, she has been having constant allergy issues, nasal congestion,  PND, sore throat with frequent hoarseness. She is a smoker.     9/12/22 update:  Here to f/u for FFL and re-evaluation    Review of patient's allergies indicates:   Allergen Reactions    Diphenhydramine (bulk) Other (See Comments)     Restless legs    Meperidine Itching and Anxiety           Review of Systems   Constitutional:  Negative for chills, fatigue, fever and unexpected weight change.   HENT:  Positive for congestion, ear pain, rhinorrhea, sore throat and voice change. Negative for dental problem, ear discharge, facial swelling, hearing loss, nosebleeds, postnasal drip, sinus pressure, sneezing, tinnitus and trouble swallowing.    Eyes:  Positive for photophobia. Negative for redness, itching and visual disturbance.   Respiratory:  Negative for cough, choking, shortness of breath and wheezing.    Cardiovascular: Negative.  Negative for chest pain and palpitations.   Gastrointestinal:  Positive for diarrhea. Negative for abdominal pain.        No reflux.   Endocrine: Negative.    Genitourinary: Negative.    Musculoskeletal:  Positive for neck pain. Negative for gait problem.   Skin: Negative.  Negative for rash.   Allergic/Immunologic: Positive for environmental allergies.   Neurological:  Negative for dizziness, light-headedness and headaches.   Hematological: Negative.    Psychiatric/Behavioral:  Positive for sleep disturbance.        Objective:   /80   Pulse 78   Temp 98.6 °F (37 °C) (Temporal)     Physical  Exam  Constitutional:       General: She is not in acute distress.     Appearance: She is well-developed.   HENT:      Head: Normocephalic and atraumatic.      Right Ear: Tympanic membrane, ear canal and external ear normal.      Left Ear: Tympanic membrane, ear canal and external ear normal.      Nose: Septal deviation present. No nasal deformity, mucosal edema or rhinorrhea.      Right Turbinates: Enlarged.      Left Turbinates: Enlarged.      Right Sinus: No maxillary sinus tenderness or frontal sinus tenderness.      Left Sinus: No maxillary sinus tenderness or frontal sinus tenderness.        Mouth/Throat:      Mouth: Mucous membranes are not pale and not dry.      Dentition: No dental caries.      Pharynx: Uvula midline. No oropharyngeal exudate or posterior oropharyngeal erythema.   Eyes:      General: Lids are normal. No scleral icterus.     Extraocular Movements:      Right eye: Normal extraocular motion and no nystagmus.      Left eye: Normal extraocular motion and no nystagmus.      Conjunctiva/sclera: Conjunctivae normal.      Right eye: Right conjunctiva is not injected. No chemosis.     Left eye: Left conjunctiva is not injected. No chemosis.     Pupils: Pupils are equal, round, and reactive to light.   Neck:      Thyroid: No thyroid mass or thyromegaly.      Trachea: Trachea and phonation normal. No tracheal tenderness or tracheal deviation.   Pulmonary:      Effort: Pulmonary effort is normal. No respiratory distress.      Breath sounds: No stridor.   Abdominal:      General: There is no distension.   Lymphadenopathy:      Head:      Right side of head: No submental, submandibular, preauricular, posterior auricular or occipital adenopathy.      Left side of head: No submental, submandibular, preauricular, posterior auricular or occipital adenopathy.      Cervical: No cervical adenopathy.   Skin:     General: Skin is warm and dry.      Findings: No erythema or rash.   Neurological:      Mental Status:  She is alert and oriented to person, place, and time.      Cranial Nerves: No cranial nerve deficit.   Psychiatric:         Behavior: Behavior normal.      Due to indication in patient's history, presentation or risk factors,  a fiber optic exam was performed.    SEPARATE PROCEDURE NOTE:    ANESTHESIA:  Topical xylocaine with nidia-synephrine  FINDINGS:  Moderate to severe inaterarytenoid erythema and edema    PROCEDURE:  After verbal consent was obtained, the flexible scope was passed through the patient's nasal cavity without difficulty.  The nasopharynx (adenoid pad) and eustachian tube orifices were first visualized and were found to be normal, without masses or irregularity.  The posterior pharyngeal wall and base of tongue were then examined and no mass or irregular tissue was seen.  The scope was then advanced to the larynx, and the epiglottis, valleculae, and piriform sinuses were normal, without masses or mucosal irregularity.  The false vocal folds and true vocal folds were then examined and were found to have normal mobility (full abduction and adduction) and no masses or mucosal irregularity was seen.  The interartyenoid area had moderate to severe edema and erythema consistent with reflux.      Assessment:     1. Laryngopharyngeal reflux (LPR)    2. Nasal septal deviation    3. Non-seasonal allergic rhinitis, unspecified trigger    4. Nasal valve collapse    5. Hypertrophy of inferior nasal turbinate          Plan:     Laryngopharyngeal reflux (LPR)  -     omeprazole (PRILOSEC) 40 MG capsule; Take 1 capsule (40 mg total) by mouth 2 (two) times daily before meals.  Dispense: 60 capsule; Refill: 5    Nasal septal deviation    Non-seasonal allergic rhinitis, unspecified trigger    Nasal valve collapse    Hypertrophy of inferior nasal turbinate        We discussed restarting Flonase for PND and thought ETD. She will also continue frequent saline flushes. We have scheduled her to return to clinic for a scope  evaluation. She has appointments already scheduled for Allergy as well as GI.    9/12/22 update:   She has a pending allergy referral.  We agreed to increase her omeprazole to b.i.d. with plans to wean back down after symptom relief is achieved.  She will follow-up with us in 12 weeks.    Laryngopharyngeal Reflux (LPR) Patient Information and Medication Instructions    LPR (laryngopharyngeal reflux) can be a challenging condition to control.  Some patients require once daily medication like a proton pump inhibitor to control their symptoms (such as Omeprazole, Pantoprazole, Esomeprazole).  HOWEVER, other patients will require taking this medication twice daily and even may be started on another medication called an H2 blocker (such as Pepcid, Zantac) at bedtime.    It is important that medication is not the only technique that you use to help control your LPR.  Therapeutic lifestyle changes are very important as well:     Weight Loss:  If you are overweight, losing weight can significantly reduce your reflux.  Diet Control:  It is important to determine what your Trigger foods for reflux are.  Limiting your intake of these foods will significantly improve your reflux.  Examples of foods known to increase reflux are listed below  Carbonated beverages  Caffeine (this includes Coffee, soda, iced tea, energy drinks etc.)  Alcohol  Fried/ fatty/ greasy foods  Acidic foods (citrus, tomato etc.)  Chocolate  Spearmint/Peppermint  Elevating the head of your bed:  This doesn't mean more pillows.  You need to actually elevate the head of your bed.  Some patients have found something to put under the legs of the head of their bed.  Other patients have employed a wedge or an air bladder of some sort to elevate the head of their bed.  This makes a significant difference with reflux and can also help with nasal congestion.  Eating before bedtime:  Try not to eat anything for at least 2 hours before bedtime.  This allows for less  material to remain in your stomach when you lay down at night which equals less severe reflux.  More information:  If you would like to read more about diet changes and lifestyle changes that you can employ that will help with your reflux, the books below may be helpful.  Dropping Acid:  The Reflux diet Cookbook & Cure by Sameer Khan M.D. and Kishore Bahena M.D.  The Acid Watcher Diet:  A 28 Day Reflux Prevention and Healing Program by Etienne Orantes M.D.      Weaning Instructions After Symptom Improvement    It can sometimes take up to 12 weeks to see symptom improvement in LPR.  The medications may reduce the amount of acid you are producing very quickly, but then the tissues of your voice box and upper throat have to heal themselves.  Your physician may have increased year proton pump inhibitor to twice daily dosing and even may have added an H2 blocker at bedtime.  Eventually, the goal is a complete resolution of your symptoms.  Hopefully you have been working on the lifestyle modifications listed above over this time period as well!    Once your symptoms have improved, our goal is to wean you back off of your reflux medication.  The instructions below will help guide you through this process.    Take all anti-reflux medications for at least 3 weeks beyond when your symptoms have improved/resolved  If you are on Twice daily dosing of a proton pump inhibitor (omeprazole, pantoprazole, esomeprazole etc.) and an H2 blocker at bedtime (pepcid, zantac) then you should first discontinue your night time dose of your proton pump inhibitor.  If your symptoms are still controlled after 3 weeks of taking your PPI in the morning and your H2 blocker at bedtime,  discontinue your bedtime H2 blocker  If your symptoms are still controlled after 3 more weeks of only taking your PPI in the morning, discontinue your morning PPI    If at any point your symptoms return during this weaning process, you can return to the previous step  and let your physician know at the next appointment.

## 2022-11-18 ENCOUNTER — PATIENT MESSAGE (OUTPATIENT)
Dept: GASTROENTEROLOGY | Facility: CLINIC | Age: 37
End: 2022-11-18
Payer: OTHER GOVERNMENT

## 2022-11-23 ENCOUNTER — OFFICE VISIT (OUTPATIENT)
Dept: DERMATOLOGY | Facility: CLINIC | Age: 37
End: 2022-11-23
Payer: OTHER GOVERNMENT

## 2022-11-23 DIAGNOSIS — L81.4 SOLAR LENTIGO: ICD-10-CM

## 2022-11-23 DIAGNOSIS — D22.9 MULTIPLE BENIGN NEVI: ICD-10-CM

## 2022-11-23 DIAGNOSIS — L73.9 FOLLICULITIS: Primary | ICD-10-CM

## 2022-11-23 DIAGNOSIS — L57.0 ACTINIC KERATOSIS: ICD-10-CM

## 2022-11-23 DIAGNOSIS — D18.00 HEMANGIOMA, UNSPECIFIED SITE: ICD-10-CM

## 2022-11-23 DIAGNOSIS — L30.4 INTERTRIGO: ICD-10-CM

## 2022-11-23 PROCEDURE — 17000 DESTRUCT PREMALG LESION: CPT | Mod: PBBFAC | Performed by: STUDENT IN AN ORGANIZED HEALTH CARE EDUCATION/TRAINING PROGRAM

## 2022-11-23 PROCEDURE — 99999 PR PBB SHADOW E&M-EST. PATIENT-LVL III: ICD-10-PCS | Mod: PBBFAC,,, | Performed by: STUDENT IN AN ORGANIZED HEALTH CARE EDUCATION/TRAINING PROGRAM

## 2022-11-23 PROCEDURE — 17000 PR DESTRUCTION(LASER SURGERY,CRYOSURGERY,CHEMOSURGERY),PREMALIGNANT LESIONS,FIRST LESION: ICD-10-PCS | Mod: S$PBB,,, | Performed by: STUDENT IN AN ORGANIZED HEALTH CARE EDUCATION/TRAINING PROGRAM

## 2022-11-23 PROCEDURE — 99213 OFFICE O/P EST LOW 20 MIN: CPT | Mod: PBBFAC | Performed by: STUDENT IN AN ORGANIZED HEALTH CARE EDUCATION/TRAINING PROGRAM

## 2022-11-23 PROCEDURE — 99204 PR OFFICE/OUTPT VISIT, NEW, LEVL IV, 45-59 MIN: ICD-10-PCS | Mod: 25,S$PBB,, | Performed by: STUDENT IN AN ORGANIZED HEALTH CARE EDUCATION/TRAINING PROGRAM

## 2022-11-23 PROCEDURE — 99999 PR PBB SHADOW E&M-EST. PATIENT-LVL III: CPT | Mod: PBBFAC,,, | Performed by: STUDENT IN AN ORGANIZED HEALTH CARE EDUCATION/TRAINING PROGRAM

## 2022-11-23 PROCEDURE — 99204 OFFICE O/P NEW MOD 45 MIN: CPT | Mod: 25,S$PBB,, | Performed by: STUDENT IN AN ORGANIZED HEALTH CARE EDUCATION/TRAINING PROGRAM

## 2022-11-23 PROCEDURE — 17000 DESTRUCT PREMALG LESION: CPT | Mod: S$PBB,,, | Performed by: STUDENT IN AN ORGANIZED HEALTH CARE EDUCATION/TRAINING PROGRAM

## 2022-11-23 RX ORDER — HYDROCORTISONE 25 MG/G
CREAM TOPICAL 2 TIMES DAILY
Qty: 30 G | Refills: 1 | Status: SHIPPED | OUTPATIENT
Start: 2022-11-23

## 2022-11-23 RX ORDER — CLINDAMYCIN PHOSPHATE 11.9 MG/ML
SOLUTION TOPICAL 2 TIMES DAILY
Qty: 60 ML | Refills: 5 | Status: SHIPPED | OUTPATIENT
Start: 2022-11-23 | End: 2023-01-11

## 2022-11-23 RX ORDER — KETOCONAZOLE 20 MG/G
CREAM TOPICAL 2 TIMES DAILY
Qty: 30 G | Refills: 1 | Status: SHIPPED | OUTPATIENT
Start: 2022-11-23

## 2022-11-23 NOTE — PROGRESS NOTES
Patient Information  Name: Columba Romano  : 1985  MRN: [id number]     Referring Physician:  Dr. Wright   Primary Care Physician:  Dr. Joan Forrester MD   Date of Visit: 2022      Subjective:       Columba Romano is a 37 y.o. female who presents for   Chief Complaint   Patient presents with    Skin Check     Full body. Concerning moles. Lesion near eye    skin lesion      Underneath breast. Tx essentials oils      HPI  Patient here for skin check.     Does patient have a personal hx of skin cancers? no  Does patient have family hx of melanoma?  no  Does patient have hx of strong sun exposure or tanning bed use in the past? yes    Patient with new complaint of lesion(s)  Location: breasts, groin  Duration: mweeks  Symptoms: red bumps, itchy  Relieving factors/Previous treatments: essential oil    Patient was last seen:Visit date not found     Prior notes by myself reviewed.   Clinical documentation obtained by nursing staff reviewed.    Review of Systems   Skin:  Negative for itching and rash.      Objective:    Physical Exam   Constitutional: She appears well-developed and well-nourished. No distress.   Neurological: She is alert and oriented to person, place, and time. She is not disoriented.   Psychiatric: She has a normal mood and affect.   Skin:   Areas Examined (abnormalities noted in diagram):   Scalp / Hair Palpated and Inspected  Head / Face Inspection Performed  Neck Inspection Performed  Chest / Axilla Inspection Performed  Abdomen Inspection Performed  Genitals / Buttocks / Groin Inspection Performed  Back Inspection Performed  RUE Inspected  LUE Inspection Performed  RLE Inspected  LLE Inspection Performed  Nails and Digits Inspection Performed                 Diagram Legend     Erythematous scaling macule/papule c/w actinic keratosis       Vascular papule c/w angioma      Pigmented verrucoid papule/plaque c/w seborrheic keratosis      Yellow umbilicated papule c/w sebaceous hyperplasia       Irregularly shaped tan macule c/w lentigo     1-2 mm smooth white papules consistent with Milia      Movable subcutaneous cyst with punctum c/w epidermal inclusion cyst      Subcutaneous movable cyst c/w pilar cyst      Firm pink to brown papule c/w dermatofibroma      Pedunculated fleshy papule(s) c/w skin tag(s)      Evenly pigmented macule c/w junctional nevus     Mildly variegated pigmented, slightly irregular-bordered macule c/w mildly atypical nevus      Flesh colored to evenly pigmented papule c/w intradermal nevus       Pink pearly papule/plaque c/w basal cell carcinoma      Erythematous hyperkeratotic cursted plaque c/w SCC      Surgical scar with no sign of skin cancer recurrence      Open and closed comedones      Inflammatory papules and pustules      Verrucoid papule consistent consistent with wart     Erythematous eczematous patches and plaques     Dystrophic onycholytic nail with subungual debris c/w onychomycosis     Umbilicated papule    Erythematous-base heme-crusted tan verrucoid plaque consistent with inflamed seborrheic keratosis     Erythematous Silvery Scaling Plaque c/w Psoriasis     See annotation      No images are attached to the encounter or orders placed in the encounter.    [] Data reviewed  [] Independent review of test  [] Management discussed with another provider    Assessment / Plan:        Folliculitis  -     clindamycin (CLEOCIN T) 1 % external solution; Apply topically 2 (two) times daily. Apply to affected areas on breasts  Dispense: 60 mL; Refill: 5    Intertrigo  -     ketoconazole (NIZORAL) 2 % cream; Apply topically 2 (two) times daily. Mix with hydrocortisone cream and AAA on groin BID for up to 2 weeks at a time  Dispense: 30 g; Refill: 1  -     hydrocortisone 2.5 % cream; Apply topically 2 (two) times daily. Mix with ketoconazole cream and AAA on groin BID for up to 2 weeks at a time  Dispense: 30 g; Refill: 1  Counseling on topical steroids:  Patient counseled that the  prolonged use of topical steroids can result in the increased appearance superficial blood vessels (telangiectasias) lightening (hypopigmentation), and   thinning of the skin ( atrophy).  Patient understands to avoid using high potency steroids in skin folds, the groin or the face.  The patient verbalized understanding of proper use and possible adverse effects of topical steroids.  All patient's questions and concerns were addressed.    Solar lentigo  This is a benign hyperpigmented sun induced lesion. Recommend daily sun protection/avoidance and use of at least SPF 30, broad spectrum sunscreen (OTC drug) will reduce the number of new lesions. Treatment of these benign lesions are considered cosmetic.    Hemangioma, unspecified site  This is a benign vascular lesion. Reassurance given. No treatment required.     Multiple benign nevi  Discussed ABCDE's of nevi.  Monitor for new mole or moles that are becoming bigger, darker, irritated, or developing irregular borders. Brochure provided. Instructed patient to observe lesion(s) for changes and follow up in clinic if changes are noted. Patient to monitor skin at home for new or changing lesions.     Actinic keratosis  Cryosurgery Procedure Note    Verbal consent from the patient is obtained including, but not limited to, risk of hypopigmentation/hyperpigmentation, scar, recurrence of lesion. The patient is aware of the precancerous quality and need for treatment of these lesions. Liquid nitrogen cryosurgery is applied to the 1 actinic keratoses, as detailed in the physical exam, to produce a freeze injury. The patient is aware that blisters may form and is instructed on wound care with gentle cleansing and use of vaseline ointment to keep moist until healed. The patient is supplied a handout on cryosurgery and is instructed to call if lesions do not completely resolve.           LOS NUMBER AND COMPLEXITY OF PROBLEMS    COMPLEXITY OF DATA RISK TOTAL TIME (m)    17589  [id number] [] 1 self-limited or minor problem [x] Minimal to none [] No treatment recommended or patient to monitor 15-29  10-19   68285  [id number] Low  [] 2 or > self limited or minor problems  [] 1 stable chronic illness  [x] 1 acute, uncomplicated illness or injury Limited (2)  [] Prior external notes from each unique source  [] Review result of each unique test  [] Order each unique test []  Low  OTC medications, minor skin biopsy 30-44  20-29   67639  23280 Moderate  []  1 or > chronic illness with progression, exacerbation or SE of treatment  [x]  2 or more stable chronic illnesses  []  1 acute illness with systemic symptoms  []  1 acute complicated injury  []  1 undiagnosed new problem with uncertain prognosis Moderate (1/3 below)  []  3 or more data items        *Now includes assessment requiring independent historian  []  Independent interpretation of a test  []  Discuss management/test with another provider Moderate  [x]  Prescription drug mgmt  []  Minor surgery with risk discussed  []  Mgmt limited by social determinates 45-59  30-39   62765  03754 High  []  1 or more chronic illness with severe exacerbation, progression or SE of treatment  []  1 acute or chronic illness/injury that poses a threat to life or bodily function Extensive (2/3 below)  []  3 or more data items        *Now includes assessment requiring independent historian.  []  Independent interpretation of a test  []  Discuss management/test with another provider High  []  Major surgery with risk discussed  []  Drug therapy requiring intensive monitoring for toxicity  []  Hospitalization  []  Decision for DNR 60-74  40-54      No follow-ups on file.    Ariadna Bhatti MD, FAAD  [facility]

## 2022-11-23 NOTE — PATIENT INSTRUCTIONS

## 2023-01-11 ENCOUNTER — LAB VISIT (OUTPATIENT)
Dept: LAB | Facility: HOSPITAL | Age: 38
End: 2023-01-11
Attending: ALLERGY & IMMUNOLOGY
Payer: OTHER GOVERNMENT

## 2023-01-11 ENCOUNTER — OFFICE VISIT (OUTPATIENT)
Dept: ALLERGY | Facility: CLINIC | Age: 38
End: 2023-01-11
Payer: OTHER GOVERNMENT

## 2023-01-11 VITALS
BODY MASS INDEX: 33.38 KG/M2 | TEMPERATURE: 98 F | HEIGHT: 65 IN | WEIGHT: 200.38 LBS | DIASTOLIC BLOOD PRESSURE: 80 MMHG | SYSTOLIC BLOOD PRESSURE: 114 MMHG | HEART RATE: 81 BPM

## 2023-01-11 DIAGNOSIS — J45.40 MODERATE PERSISTENT ASTHMA WITHOUT COMPLICATION: ICD-10-CM

## 2023-01-11 DIAGNOSIS — J31.0 RHINITIS, UNSPECIFIED TYPE: ICD-10-CM

## 2023-01-11 DIAGNOSIS — J31.0 RHINITIS, UNSPECIFIED TYPE: Primary | ICD-10-CM

## 2023-01-11 LAB
BASOPHILS # BLD AUTO: 0.07 K/UL (ref 0–0.2)
BASOPHILS NFR BLD: 1 % (ref 0–1.9)
DIFFERENTIAL METHOD: ABNORMAL
EOSINOPHIL # BLD AUTO: 0.6 K/UL (ref 0–0.5)
EOSINOPHIL NFR BLD: 7.6 % (ref 0–8)
ERYTHROCYTE [DISTWIDTH] IN BLOOD BY AUTOMATED COUNT: 12.5 % (ref 11.5–14.5)
HCT VFR BLD AUTO: 39.4 % (ref 37–48.5)
HGB BLD-MCNC: 12.8 G/DL (ref 12–16)
IGE SERPL-ACNC: 97 IU/ML (ref 0–100)
IMM GRANULOCYTES # BLD AUTO: 0.04 K/UL (ref 0–0.04)
IMM GRANULOCYTES NFR BLD AUTO: 0.6 % (ref 0–0.5)
LYMPHOCYTES # BLD AUTO: 1.7 K/UL (ref 1–4.8)
LYMPHOCYTES NFR BLD: 23.2 % (ref 18–48)
MCH RBC QN AUTO: 29.5 PG (ref 27–31)
MCHC RBC AUTO-ENTMCNC: 32.5 G/DL (ref 32–36)
MCV RBC AUTO: 91 FL (ref 82–98)
MONOCYTES # BLD AUTO: 0.5 K/UL (ref 0.3–1)
MONOCYTES NFR BLD: 6.9 % (ref 4–15)
NEUTROPHILS # BLD AUTO: 4.4 K/UL (ref 1.8–7.7)
NEUTROPHILS NFR BLD: 60.7 % (ref 38–73)
NRBC BLD-RTO: 0 /100 WBC
PLATELET # BLD AUTO: 241 K/UL (ref 150–450)
PMV BLD AUTO: 11.5 FL (ref 9.2–12.9)
RBC # BLD AUTO: 4.34 M/UL (ref 4–5.4)
WBC # BLD AUTO: 7.21 K/UL (ref 3.9–12.7)

## 2023-01-11 PROCEDURE — 99999 PR PBB SHADOW E&M-EST. PATIENT-LVL III: ICD-10-PCS | Mod: PBBFAC,,, | Performed by: ALLERGY & IMMUNOLOGY

## 2023-01-11 PROCEDURE — 86003 ALLG SPEC IGE CRUDE XTRC EA: CPT | Mod: 59 | Performed by: ALLERGY & IMMUNOLOGY

## 2023-01-11 PROCEDURE — 36415 COLL VENOUS BLD VENIPUNCTURE: CPT | Performed by: ALLERGY & IMMUNOLOGY

## 2023-01-11 PROCEDURE — 99204 OFFICE O/P NEW MOD 45 MIN: CPT | Mod: S$PBB,,, | Performed by: ALLERGY & IMMUNOLOGY

## 2023-01-11 PROCEDURE — 99999 PR PBB SHADOW E&M-EST. PATIENT-LVL III: CPT | Mod: PBBFAC,,, | Performed by: ALLERGY & IMMUNOLOGY

## 2023-01-11 PROCEDURE — 82785 ASSAY OF IGE: CPT | Performed by: ALLERGY & IMMUNOLOGY

## 2023-01-11 PROCEDURE — 86003 ALLG SPEC IGE CRUDE XTRC EA: CPT | Performed by: ALLERGY & IMMUNOLOGY

## 2023-01-11 PROCEDURE — 99213 OFFICE O/P EST LOW 20 MIN: CPT | Mod: PBBFAC | Performed by: ALLERGY & IMMUNOLOGY

## 2023-01-11 PROCEDURE — 99204 PR OFFICE/OUTPT VISIT, NEW, LEVL IV, 45-59 MIN: ICD-10-PCS | Mod: S$PBB,,, | Performed by: ALLERGY & IMMUNOLOGY

## 2023-01-11 PROCEDURE — 85025 COMPLETE CBC W/AUTO DIFF WBC: CPT | Performed by: ALLERGY & IMMUNOLOGY

## 2023-01-11 RX ORDER — FLUTICASONE PROPIONATE AND SALMETEROL XINAFOATE 230; 21 UG/1; UG/1
2 AEROSOL, METERED RESPIRATORY (INHALATION) 2 TIMES DAILY
Qty: 12 G | Refills: 5 | Status: SHIPPED | OUTPATIENT
Start: 2023-01-11 | End: 2024-01-11

## 2023-01-11 RX ORDER — MONTELUKAST SODIUM 10 MG/1
10 TABLET ORAL NIGHTLY
Qty: 30 TABLET | Refills: 0 | Status: SHIPPED | OUTPATIENT
Start: 2023-01-11 | End: 2023-01-11

## 2023-01-11 NOTE — PROGRESS NOTES
Subjective:       Patient ID: Columba Romano is a 37 y.o. female.    Chief Complaint:  Allergies      HPI: Allergic Rhinitis:  Patient's symptoms include clear rhinorrhea, itchy eyes, itchy nose, and itchy palate. These symptoms are perennial. Current triggers include exposure to  pet dander . The patient has been suffering from these symptoms for approximately 9 years. The patient has tried over the counter medications and prescription antihistamines with fair relief of symptoms. Immunotherapy has never been tried. The patient has never had nasal polyps. The patient has a history of asthma. The patient has no history of eczema. The patient takes antibiotics for sinusitis 2 times per year. The patient has not had sinus surgery in the past.       Currently taking Claritin    Recently started on albuterol      Hives daily- related to dog exposure. She feels hives are related to Dog exposure.  She has 2 dogs at home. She feels her need for albuterol is related to her dogs  Albuterol usage- 2 times a week    Oral steroids and antibiotics 2-3 times a year    Past Medical History:   Diagnosis Date    Bicuspid aortic valve     open and flows fine, has checked yearly with ultrasound    IBS (irritable bowel syndrome)           Family History   Problem Relation Age of Onset    Breast cancer Paternal Aunt     Hypothyroidism Mother     Heart disease Mother     Skin cancer Mother     Heart disease Father     Emphysema Father     Stroke Father     Colon cancer Neg Hx     Ovarian cancer Neg Hx     Uterine cancer Neg Hx         Current Outpatient Medications on File Prior to Visit   Medication Sig Dispense Refill    albuterol (PROVENTIL/VENTOLIN HFA) 90 mcg/actuation inhaler INHALE 2 PUFFS INTO THE LUNGS EVERY 6 HOURS AS NEEDED FOR WHEEZING( RESCUE) 54 g 3    hydrocortisone 2.5 % cream Apply topically 2 (two) times daily. Mix with ketoconazole cream and AAA on groin BID for up to 2 weeks at a time 30 g 1    ketoconazole (NIZORAL)  2 % cream Apply topically 2 (two) times daily. Mix with hydrocortisone cream and AAA on groin BID for up to 2 weeks at a time 30 g 1    multivitamin capsule Take 1 capsule by mouth once daily.      omeprazole (PRILOSEC) 40 MG capsule Take 1 capsule (40 mg total) by mouth once daily. 30 capsule 11    clindamycin (CLEOCIN T) 1 % external solution Apply topically 2 (two) times daily. Apply to affected areas on breasts 60 mL 5    [DISCONTINUED] levocetirizine (XYZAL) 5 MG tablet       [DISCONTINUED] omeprazole (PRILOSEC) 40 MG capsule Take 1 capsule (40 mg total) by mouth 2 (two) times daily before meals. 60 capsule 5     No current facility-administered medications on file prior to visit.        Review of patient's allergies indicates:   Allergen Reactions    Diphenhydramine (bulk) Other (See Comments)     Restless legs    Meperidine Itching, Anxiety and Hallucinations          Environmental History: Pets in the home: dogs (2).  Review of Systems   Constitutional:  Negative for chills and fever.   HENT:  Positive for congestion and rhinorrhea.    Eyes:  Positive for discharge and itching.   Respiratory:  Positive for shortness of breath and wheezing.    Gastrointestinal:  Negative for nausea and vomiting.   Endocrine: Negative for cold intolerance and heat intolerance.   Skin:  Positive for rash. Negative for wound.   Allergic/Immunologic: Positive for environmental allergies. Negative for food allergies.   Neurological:  Negative for facial asymmetry and speech difficulty.   Hematological:  Negative for adenopathy. Bruises/bleeds easily.   Psychiatric/Behavioral:  Negative for behavioral problems and suicidal ideas.       Objective:    Physical Exam  Vitals reviewed.   Constitutional:       General: She is not in acute distress.     Appearance: Normal appearance. She is well-developed. She is not ill-appearing, toxic-appearing or diaphoretic.   HENT:      Head: Normocephalic and atraumatic.      Right Ear: Tympanic  membrane, ear canal and external ear normal. There is no impacted cerumen.      Left Ear: Tympanic membrane, ear canal and external ear normal. There is no impacted cerumen.      Nose: Nose normal. No congestion or rhinorrhea.      Mouth/Throat:      Pharynx: No oropharyngeal exudate or posterior oropharyngeal erythema.   Eyes:      General: No scleral icterus.        Right eye: No discharge.         Left eye: No discharge.      Pupils: Pupils are equal, round, and reactive to light.   Neck:      Thyroid: No thyromegaly.   Cardiovascular:      Rate and Rhythm: Normal rate and regular rhythm.      Heart sounds: Normal heart sounds. No murmur heard.    No friction rub. No gallop.   Pulmonary:      Effort: Pulmonary effort is normal. No respiratory distress.      Breath sounds: Normal breath sounds. No stridor. No wheezing, rhonchi or rales.   Chest:      Chest wall: No tenderness.   Abdominal:      General: Bowel sounds are normal. There is no distension.      Palpations: Abdomen is soft. There is no mass.      Tenderness: There is no abdominal tenderness. There is no guarding or rebound.      Hernia: No hernia is present.   Musculoskeletal:         General: No swelling, tenderness, deformity or signs of injury. Normal range of motion.      Cervical back: Normal range of motion and neck supple. No rigidity or tenderness. No muscular tenderness.      Right lower leg: No edema.      Left lower leg: No edema.   Lymphadenopathy:      Cervical: No cervical adenopathy.   Skin:     General: Skin is warm.      Coloration: Skin is not jaundiced or pale.      Findings: No bruising, erythema or rash.   Neurological:      General: No focal deficit present.      Mental Status: She is alert and oriented to person, place, and time.      Gait: Gait normal.   Psychiatric:         Mood and Affect: Mood normal.         Behavior: Behavior normal.         Thought Content: Thought content normal.         Judgment: Judgment normal.           Assessment:       1. Rhinitis, unspecified type    2. Moderate persistent asthma without complication         Plan:       Rhinitis, unspecified type  -     IgE; Future; Expected date: 01/11/2023  -     Bahia grass IgE; Future; Expected date: 01/11/2023  -     Aspergillus fumagatus IgE; Future; Expected date: 01/11/2023  -     Chaetomium globosum IgE; Future; Expected date: 01/11/2023  -     Cockroach, American IgE; Future; Expected date: 01/11/2023  -     Cladosporium IgE; Future; Expected date: 01/11/2023  -     Curvularia lunata IgE; Future; Expected date: 01/11/2023  -     D. farinae IgE; Future; Expected date: 01/11/2023  -     D. pteronyssinus IgE; Future; Expected date: 01/11/2023  -     Dog dander IgE; Future; Expected date: 01/11/2023  -     Plantain, English IgE; Future; Expected date: 01/11/2023  -     Eucalyptus IgE; Future; Expected date: 01/11/2023  -     Miller elder, rough IgE; Future; Expected date: 01/11/2023  -     Mugwort IgE; Future; Expected date: 01/11/2023  -     Nettle IgE; Future; Expected date: 01/11/2023  -     Orchard grass IgE; Future; Expected date: 01/11/2023  -     [place], western white IgE; Future; Expected date: 01/11/2023  -     Privet, common IgE; Future; Expected date: 01/11/2023  -     Ragweed, short, common IgE; Future; Expected date: 01/11/2023  -     Red top grass IgE; Future; Expected date: 01/11/2023  -     Rye grass, cultivated IgE; Future; Expected date: 01/11/2023  -     Thistle, Russian IgE; Future; Expected date: 01/11/2023  -     Stemphyllium IgE; Future; Expected date: 01/11/2023  -     Tony IgE; Future; Expected date: 01/11/2023  -     Javier grass IgE; Future; Expected date: 01/11/2023  -     Allergen, Pecan Tree IgE; Future; Expected date: 01/11/2023  -     Galena, black IgE; Future; Expected date: 01/11/2023  -     Valley, bald IgE; Future; Expected date: 01/11/2023  -     Oak, white IgE; Future; Expected date: 01/11/2023  -     Allergen, Cocklebur; Future;  Expected date: 01/11/2023  -     Cat epithelium IgE; Future; Expected date: 01/11/2023  -     Allergen, Hackberry Celtis; Future; Expected date: 01/11/2023  -     Allergen, Elm Cedar; Future; Expected date: 01/11/2023  -     Allergen-[place]; Future; Expected date: 01/11/2023  -     Allergen-Alternaria Alternata; Future; Expected date: 01/11/2023  -     Allergen-Maple [place]; Future; Expected date: 01/11/2023  -     RAST Allergen for Eastern [place]; Future; Expected date: 01/11/2023  -     RAST Allergen Maple ([place]); Future; Expected date: 01/11/2023  -     Allergen, White [place]; Future; Expected date: 01/11/2023  -     Allergen, Meadow Grass (PRXy Blue); Future; Expected date: 01/11/2023  -     Allergen-Silver Birch; Future; Expected date: 01/11/2023  -     RAST Allergen [place]; Future; Expected date: 01/11/2023  -     RAST Allergen, Sheep [place]); Future; Expected date: 01/11/2023    Moderate persistent asthma without complication  -     CBC Auto Differential; Future; Expected date: 01/11/2023  -     montelukast (SINGULAIR) 10 mg tablet; Take 1 tablet (10 mg total) by mouth every evening.  Dispense: 30 tablet; Refill: 0  -     fluticasone-salmeterol 230-21 mcg/dose (ADVAIR HFA) 230-21 mcg/actuation HFAA inhaler; Inhale 2 puffs into the lungs 2 (two) times daily. Controller  Dispense: 12 g; Refill: 5     Starting Singulair and Advair.    Patient states that she is unable to stop antihistamines for 7 days for skin prick testing, so specific IgE testing via the blood was ordered.  Unable to perform spirometry due to being at [facility] today.    RTC 4-6 weeks or sooner, if needed     VELIA CABRALES                    Problems Address                                                 Amount and/or Complexity                                                                      Risk       3           [] 2 or more self-limited or minor problems                      [] Limited                                                                         [] Low                  [] 1 stable chronic illness                                                  Any combination of the two                                               OTC drugs                  []Acute, uncomplicated illness or injury                            Review of prior external notes from unique source           Minor surgery with no risk factors                                                                                                               [] 1 []2  []3+                                                                                                              Review of results from each unique test                                                                                                               [] 1 []2  [] 3+                                                                                                              Order of each unique test                                                                                                               [] 1 []2  [] 3+                                                                                                              Or                                                                                                             [] Assessment requiring an independent historian      4            [] One or more chronic illness with exacerbation,              [] Moderate                                                                      [x] Moderate                 Progression, or side effects of treatment                            -test documents or independent historians                        Prescription drug management                [x]  2 or more stable chronic illnesses                                    [] Independent interpretation of tests                              Minor surgery with identifiable risk                [] 1 undiagnosed new problem with uncertain  prognosis    [] Discussion or management of test results                    elective major surgery                [] 1 acute illness with                systemic symptoms                                                                                                                                                              [] 1 acute complicated injury                                                                                                                                          Elective major surgery                                                                                                                                                                                                                                                                                                                                                                                                  5            [] 1 or more chronic illnesses with severe exacerbation,     [] Extensive(two from below)                                         [] High                                                                                                               [] Independent interpretation of results                         Drug therapy requiring intensive                                                                                                               []Discussion of management or test interpretation           monitoring                                                                                                                                                                                                       Decision to de-escalate care                 [] 1 acute or chronic illness or injury that poses a threat                                                                                               Decision regarding hospitalization

## 2023-01-13 LAB — AMER SYCAMORE IGE QN: <0.35 KU/L

## 2023-01-16 LAB
A ALTERNATA IGE QN: <0.1 KU/L
A FUMIGATUS IGE QN: <0.1 KU/L
ALLERGEN BOXELDER MAPLE TREE IGE: <0.1 KU/L
ALLERGEN CHAETOMIUM GLOBOSUM IGE: <0.1 KU/L
ALLERGEN MAPLE (BOX ELDER) CLASS: NORMAL
ALLERGEN MULBERRY CLASS: NORMAL
ALLERGEN MULBERRY TREE IGE: <0.1 KU/L
ALLERGEN WHITE ASH TREE IGE: <0.1 KU/L
ALLERGEN WHITE PINE TREE IGE: <0.1 KU/L
BAHIA GRASS IGE QN: <0.1 KU/L
BALD CYPRESS IGE QN: <0.1 KU/L
C HERBARUM IGE QN: <0.1 KU/L
C LUNATA IGE QN: <0.1 KU/L
CAT DANDER IGE QN: 0.42 KU/L
CHAETOMIUM GLOB. CLASS: NORMAL
COCKLEBUR IGE QN: <0.1 KU/L
COCKSFOOT IGE QN: <0.1 KU/L
COMMON RAGWEED IGE QN: <0.1 KU/L
COTTONWOOD IGE QN: <0.1 KU/L
D FARINAE IGE QN: <0.1 KU/L
D PTERONYSS IGE QN: <0.1 KU/L
DEPRECATED A ALTERNATA IGE RAST QL: NORMAL
DEPRECATED A FUMIGATUS IGE RAST QL: NORMAL
DEPRECATED BAHIA GRASS IGE RAST QL: NORMAL
DEPRECATED BALD CYPRESS IGE RAST QL: NORMAL
DEPRECATED C HERBARUM IGE RAST QL: NORMAL
DEPRECATED C LUNATA IGE RAST QL: NORMAL
DEPRECATED CAT DANDER IGE RAST QL: ABNORMAL
DEPRECATED COCKLEBUR IGE RAST QL: NORMAL
DEPRECATED COCKSFOOT IGE RAST QL: NORMAL
DEPRECATED COMMON RAGWEED IGE RAST QL: NORMAL
DEPRECATED COTTONWOOD IGE RAST QL: NORMAL
DEPRECATED D FARINAE IGE RAST QL: NORMAL
DEPRECATED D PTERONYSS IGE RAST QL: NORMAL
DEPRECATED DOG DANDER IGE RAST QL: ABNORMAL
DEPRECATED ELDER IGE RAST QL: NORMAL
DEPRECATED ENGL PLANTAIN IGE RAST QL: NORMAL
DEPRECATED GUM-TREE IGE RAST QL: NORMAL
DEPRECATED HACKBERRY TREE IGE RAST QL: NORMAL
DEPRECATED JOHNSON GRASS IGE RAST QL: NORMAL
DEPRECATED KENT BLUE GRASS IGE RAST QL: NORMAL
DEPRECATED LONDON PLANE IGE RAST QL: NORMAL
DEPRECATED MUGWORT IGE RAST QL: NORMAL
DEPRECATED NETTLE IGE RAST QL: NORMAL
DEPRECATED PECAN/HICK TREE IGE RAST QL: NORMAL
DEPRECATED PER RYE GRASS IGE RAST QL: NORMAL
DEPRECATED PRIVET IGE RAST QL: NORMAL
DEPRECATED RED TOP GRASS IGE RAST QL: NORMAL
DEPRECATED ROACH IGE RAST QL: NORMAL
DEPRECATED SALTWORT IGE RAST QL: NORMAL
DEPRECATED SHEEP SORREL IGE RAST QL: NORMAL
DEPRECATED SILVER BIRCH IGE RAST QL: NORMAL
DEPRECATED TIMOTHY IGE RAST QL: NORMAL
DEPRECATED WHITE OAK IGE RAST QL: NORMAL
DEPRECATED WILLOW IGE RAST QL: NORMAL
DOG DANDER IGE QN: 32.3 KU/L
ELDER IGE QN: <0.1 KU/L
ELM CEDAR CLASS: NORMAL
ELM CEDAR, IGE: <0.1 KU/L
ENGL PLANTAIN IGE QN: <0.1 KU/L
GUM-TREE IGE QN: <0.1 KU/L
HACKBERRY TREE IGE QN: <0.1 KU/L
JOHNSON GRASS IGE QN: <0.1 KU/L
KENT BLUE GRASS IGE QN: <0.1 KU/L
LONDON PLANE IGE QN: <0.1 KU/L
MUGWORT IGE QN: <0.1 KU/L
NETTLE IGE QN: <0.1 KU/L
PECAN/HICK TREE IGE QN: <0.1 KU/L
PER RYE GRASS IGE QN: <0.1 KU/L
PRIVET IGE QN: <0.1 KU/L
RED TOP GRASS IGE QN: <0.1 KU/L
ROACH IGE QN: <0.1 KU/L
SALTWORT IGE QN: <0.1 KU/L
SHEEP SORREL IGE QN: <0.1 KU/L
SILVER BIRCH IGE QN: <0.1 KU/L
STEMPHYLIUM HERBARUM CLASS: NORMAL
STEMPHYLLIUM, IGE: <0.1 KU/L
TIMOTHY IGE QN: <0.1 KU/L
WHITE ASH CLASS: NORMAL
WHITE OAK IGE QN: <0.1 KU/L
WHITE PINE CLASS: NORMAL
WILLOW IGE QN: <0.1 KU/L

## 2023-01-24 DIAGNOSIS — R04.2 COUGH WITH HEMOPTYSIS: ICD-10-CM

## 2023-01-24 RX ORDER — ALBUTEROL SULFATE 90 UG/1
AEROSOL, METERED RESPIRATORY (INHALATION)
Qty: 54 G | Refills: 2 | Status: SHIPPED | OUTPATIENT
Start: 2023-01-24

## 2023-01-24 NOTE — TELEPHONE ENCOUNTER
Refill Decision Note   Columba Romano  is requesting a refill authorization.  Brief Assessment and Rationale for Refill:  Approve     Medication Therapy Plan:       Medication Reconciliation Completed: No   Comments:     No Care Gaps recommended.     Note composed:5:05 AM 01/24/2023

## 2023-01-24 NOTE — TELEPHONE ENCOUNTER
No new care gaps identified.  [facility] Embedded Care Gaps. Reference number: [id number]. 1/24/2023   3:41:39 AM CST

## 2023-03-14 ENCOUNTER — TELEPHONE (OUTPATIENT)
Dept: GASTROENTEROLOGY | Facility: CLINIC | Age: 38
End: 2023-03-14
Payer: OTHER GOVERNMENT

## 2023-03-30 DIAGNOSIS — K21.9 GASTROESOPHAGEAL REFLUX DISEASE, UNSPECIFIED WHETHER ESOPHAGITIS PRESENT: ICD-10-CM

## 2023-03-30 RX ORDER — OMEPRAZOLE 40 MG/1
CAPSULE, DELAYED RELEASE ORAL
Qty: 30 CAPSULE | Refills: 11 | OUTPATIENT
Start: 2023-03-30

## 2023-03-30 NOTE — TELEPHONE ENCOUNTER
Refill Decision Note   Columba Romano  is requesting a refill authorization.  Brief Assessment and Rationale for Refill:  Quick Discontinue     Medication Therapy Plan:  E-Prescribing Status: Receipt confirmed by pharmacy 3/30/2023      Comments:     Note composed:10:52 AM 03/30/2023             Appointments     Last Visit   9/6/2022 Joan Forrester MD   Next Visit   3/30/2023 Joan Forrester MD

## 2023-03-30 NOTE — TELEPHONE ENCOUNTER
No new care gaps identified.  [facility] Embedded Care Gaps. Reference number: [id number]. 3/30/2023   3:39:40 AM YAIMAT

## 2023-07-07 ENCOUNTER — PATIENT MESSAGE (OUTPATIENT)
Dept: INFECTIOUS DISEASES | Facility: CLINIC | Age: 38
End: 2023-07-07
Payer: OTHER GOVERNMENT

## 2023-09-21 ENCOUNTER — LAB VISIT (OUTPATIENT)
Dept: LAB | Facility: HOSPITAL | Age: 38
End: 2023-09-21
Attending: OTOLARYNGOLOGY
Payer: OTHER GOVERNMENT

## 2023-09-21 ENCOUNTER — TELEPHONE (OUTPATIENT)
Dept: OTOLARYNGOLOGY | Facility: CLINIC | Age: 38
End: 2023-09-21
Payer: OTHER GOVERNMENT

## 2023-09-21 ENCOUNTER — OFFICE VISIT (OUTPATIENT)
Dept: OTOLARYNGOLOGY | Facility: CLINIC | Age: 38
End: 2023-09-21
Payer: OTHER GOVERNMENT

## 2023-09-21 ENCOUNTER — OFFICE VISIT (OUTPATIENT)
Dept: DERMATOLOGY | Facility: CLINIC | Age: 38
End: 2023-09-21
Payer: OTHER GOVERNMENT

## 2023-09-21 VITALS — HEIGHT: 65 IN | TEMPERATURE: 98 F | WEIGHT: 201.06 LBS | BODY MASS INDEX: 33.5 KG/M2

## 2023-09-21 DIAGNOSIS — K11.20 SIALOADENITIS: ICD-10-CM

## 2023-09-21 DIAGNOSIS — L82.1 SEBORRHEIC KERATOSIS: ICD-10-CM

## 2023-09-21 DIAGNOSIS — L57.0 ACTINIC KERATOSES: ICD-10-CM

## 2023-09-21 DIAGNOSIS — D22.9 MULTIPLE BENIGN NEVI: ICD-10-CM

## 2023-09-21 DIAGNOSIS — D18.00 HEMANGIOMA, UNSPECIFIED SITE: ICD-10-CM

## 2023-09-21 DIAGNOSIS — J30.89 NON-SEASONAL ALLERGIC RHINITIS, UNSPECIFIED TRIGGER: ICD-10-CM

## 2023-09-21 DIAGNOSIS — K11.20 SIALOADENITIS: Primary | ICD-10-CM

## 2023-09-21 DIAGNOSIS — D48.5 NEOPLASM OF UNCERTAIN BEHAVIOR OF SKIN: Primary | ICD-10-CM

## 2023-09-21 DIAGNOSIS — L81.4 SOLAR LENTIGO: ICD-10-CM

## 2023-09-21 DIAGNOSIS — Z12.83 SKIN CANCER SCREENING: ICD-10-CM

## 2023-09-21 LAB
ALBUMIN SERPL BCP-MCNC: 3.6 G/DL (ref 3.5–5.2)
ALP SERPL-CCNC: 82 U/L (ref 55–135)
ALT SERPL W/O P-5'-P-CCNC: 72 U/L (ref 10–44)
ANION GAP SERPL CALC-SCNC: 8 MMOL/L (ref 8–16)
AST SERPL-CCNC: 54 U/L (ref 10–40)
BILIRUB SERPL-MCNC: 0.1 MG/DL (ref 0.1–1)
BUN SERPL-MCNC: 13 MG/DL (ref 6–20)
CALCIUM SERPL-MCNC: 9.3 MG/DL (ref 8.7–10.5)
CHLORIDE SERPL-SCNC: 106 MMOL/L (ref 95–110)
CO2 SERPL-SCNC: 22 MMOL/L (ref 23–29)
CREAT SERPL-MCNC: 0.7 MG/DL (ref 0.5–1.4)
CRP SERPL-MCNC: 1.6 MG/L (ref 0–8.2)
ERYTHROCYTE [SEDIMENTATION RATE] IN BLOOD BY WESTERGREN METHOD: 10 MM/HR (ref 0–20)
EST. GFR  (NO RACE VARIABLE): >60 ML/MIN/1.73 M^2
GLUCOSE SERPL-MCNC: 98 MG/DL (ref 70–110)
POTASSIUM SERPL-SCNC: 4 MMOL/L (ref 3.5–5.1)
PROT SERPL-MCNC: 6.7 G/DL (ref 6–8.4)
SODIUM SERPL-SCNC: 136 MMOL/L (ref 136–145)

## 2023-09-21 PROCEDURE — 85025 COMPLETE CBC W/AUTO DIFF WBC: CPT | Performed by: OTOLARYNGOLOGY

## 2023-09-21 PROCEDURE — 88305 TISSUE EXAM BY PATHOLOGIST: CPT | Mod: 26,,, | Performed by: PATHOLOGY

## 2023-09-21 PROCEDURE — 88342 IMHCHEM/IMCYTCHM 1ST ANTB: CPT | Performed by: PATHOLOGY

## 2023-09-21 PROCEDURE — 86039 ANTINUCLEAR ANTIBODIES (ANA): CPT | Performed by: OTOLARYNGOLOGY

## 2023-09-21 PROCEDURE — 17000 PR DESTRUCTION(LASER SURGERY,CRYOSURGERY,CHEMOSURGERY),PREMALIGNANT LESIONS,FIRST LESION: ICD-10-PCS | Mod: S$PBB,XS,, | Performed by: STUDENT IN AN ORGANIZED HEALTH CARE EDUCATION/TRAINING PROGRAM

## 2023-09-21 PROCEDURE — 85651 RBC SED RATE NONAUTOMATED: CPT | Performed by: OTOLARYNGOLOGY

## 2023-09-21 PROCEDURE — 11102 TANGNTL BX SKIN SINGLE LES: CPT | Mod: S$PBB,,, | Performed by: STUDENT IN AN ORGANIZED HEALTH CARE EDUCATION/TRAINING PROGRAM

## 2023-09-21 PROCEDURE — 99214 OFFICE O/P EST MOD 30 MIN: CPT | Mod: S$PBB,,, | Performed by: OTOLARYNGOLOGY

## 2023-09-21 PROCEDURE — 17003 DESTRUCTION, PREMALIGNANT LESIONS; SECOND THROUGH 14 LESIONS: ICD-10-PCS | Mod: S$PBB,,, | Performed by: STUDENT IN AN ORGANIZED HEALTH CARE EDUCATION/TRAINING PROGRAM

## 2023-09-21 PROCEDURE — 17000 DESTRUCT PREMALG LESION: CPT | Mod: S$PBB,XS,, | Performed by: STUDENT IN AN ORGANIZED HEALTH CARE EDUCATION/TRAINING PROGRAM

## 2023-09-21 PROCEDURE — 99213 PR OFFICE/OUTPT VISIT, EST, LEVL III, 20-29 MIN: ICD-10-PCS | Mod: 25,S$PBB,, | Performed by: STUDENT IN AN ORGANIZED HEALTH CARE EDUCATION/TRAINING PROGRAM

## 2023-09-21 PROCEDURE — 99999 PR PBB SHADOW E&M-EST. PATIENT-LVL III: CPT | Mod: PBBFAC,,, | Performed by: STUDENT IN AN ORGANIZED HEALTH CARE EDUCATION/TRAINING PROGRAM

## 2023-09-21 PROCEDURE — 17003 DESTRUCT PREMALG LES 2-14: CPT | Mod: PBBFAC | Performed by: STUDENT IN AN ORGANIZED HEALTH CARE EDUCATION/TRAINING PROGRAM

## 2023-09-21 PROCEDURE — 88342 CHG IMMUNOCYTOCHEMISTRY: ICD-10-PCS | Mod: 26,,, | Performed by: PATHOLOGY

## 2023-09-21 PROCEDURE — 17000 DESTRUCT PREMALG LESION: CPT | Mod: PBBFAC,XS | Performed by: STUDENT IN AN ORGANIZED HEALTH CARE EDUCATION/TRAINING PROGRAM

## 2023-09-21 PROCEDURE — 86140 C-REACTIVE PROTEIN: CPT | Performed by: OTOLARYNGOLOGY

## 2023-09-21 PROCEDURE — 99999 PR PBB SHADOW E&M-EST. PATIENT-LVL III: ICD-10-PCS | Mod: PBBFAC,,, | Performed by: STUDENT IN AN ORGANIZED HEALTH CARE EDUCATION/TRAINING PROGRAM

## 2023-09-21 PROCEDURE — 88342 IMHCHEM/IMCYTCHM 1ST ANTB: CPT | Mod: 26,,, | Performed by: PATHOLOGY

## 2023-09-21 PROCEDURE — 11102 PR TANGENTIAL BIOPSY, SKIN, SINGLE LESION: ICD-10-PCS | Mod: S$PBB,,, | Performed by: STUDENT IN AN ORGANIZED HEALTH CARE EDUCATION/TRAINING PROGRAM

## 2023-09-21 PROCEDURE — 86431 RHEUMATOID FACTOR QUANT: CPT | Performed by: OTOLARYNGOLOGY

## 2023-09-21 PROCEDURE — 36415 COLL VENOUS BLD VENIPUNCTURE: CPT | Performed by: OTOLARYNGOLOGY

## 2023-09-21 PROCEDURE — 86235 NUCLEAR ANTIGEN ANTIBODY: CPT | Mod: 59 | Performed by: OTOLARYNGOLOGY

## 2023-09-21 PROCEDURE — 99999 PR PBB SHADOW E&M-EST. PATIENT-LVL III: CPT | Mod: PBBFAC,,, | Performed by: OTOLARYNGOLOGY

## 2023-09-21 PROCEDURE — 86235 NUCLEAR ANTIGEN ANTIBODY: CPT | Performed by: OTOLARYNGOLOGY

## 2023-09-21 PROCEDURE — 99213 OFFICE O/P EST LOW 20 MIN: CPT | Mod: PBBFAC,25 | Performed by: STUDENT IN AN ORGANIZED HEALTH CARE EDUCATION/TRAINING PROGRAM

## 2023-09-21 PROCEDURE — 17003 DESTRUCT PREMALG LES 2-14: CPT | Mod: S$PBB,,, | Performed by: STUDENT IN AN ORGANIZED HEALTH CARE EDUCATION/TRAINING PROGRAM

## 2023-09-21 PROCEDURE — 11102 TANGNTL BX SKIN SINGLE LES: CPT | Mod: PBBFAC | Performed by: STUDENT IN AN ORGANIZED HEALTH CARE EDUCATION/TRAINING PROGRAM

## 2023-09-21 PROCEDURE — 88305 TISSUE EXAM BY PATHOLOGIST: ICD-10-PCS | Mod: 26,,, | Performed by: PATHOLOGY

## 2023-09-21 PROCEDURE — 88305 TISSUE EXAM BY PATHOLOGIST: CPT | Performed by: PATHOLOGY

## 2023-09-21 PROCEDURE — 99999 PR PBB SHADOW E&M-EST. PATIENT-LVL III: ICD-10-PCS | Mod: PBBFAC,,, | Performed by: OTOLARYNGOLOGY

## 2023-09-21 PROCEDURE — 99214 PR OFFICE/OUTPT VISIT, EST, LEVL IV, 30-39 MIN: ICD-10-PCS | Mod: S$PBB,,, | Performed by: OTOLARYNGOLOGY

## 2023-09-21 PROCEDURE — 99213 OFFICE O/P EST LOW 20 MIN: CPT | Mod: 25,S$PBB,, | Performed by: STUDENT IN AN ORGANIZED HEALTH CARE EDUCATION/TRAINING PROGRAM

## 2023-09-21 PROCEDURE — 99213 OFFICE O/P EST LOW 20 MIN: CPT | Mod: PBBFAC,27,25 | Performed by: OTOLARYNGOLOGY

## 2023-09-21 PROCEDURE — 86038 ANTINUCLEAR ANTIBODIES: CPT | Performed by: OTOLARYNGOLOGY

## 2023-09-21 PROCEDURE — 80053 COMPREHEN METABOLIC PANEL: CPT | Performed by: OTOLARYNGOLOGY

## 2023-09-21 RX ORDER — LORATADINE 10 MG/1
10 TABLET ORAL DAILY
COMMUNITY

## 2023-09-21 NOTE — PROGRESS NOTES
"Referring Provider:    Self, Aaareferral  No address on file  Subjective:   Patient: Columba Romano [id number], :1985   Visit date:2023 12:52 PM    Chief Complaint:  Other (Patient was seen at the urgent care on Monday for knot on Left side of chin. Patient was told it was blocked salivary gland. Patient was put on steriods and amoxicillin. Had steriod shot in office. 4th day on medication, swelling and pain on both sides of chin.)    HPI:    Prior notes reviewed by myself.  Clinical documentation obtained by nursing staff reviewed.     37 y/o female here for evaluation of bilateral parotid swelling left greater than right.  She first noticed this over the weekend and then went to  on Monday at which time she was given a round of amoxicillin, steroids.  She feels that it has fluctuated in size since that time and has not resolved.  She has also been trying some sialogogues.        Objective:     Physical Exam:  Vitals:  Temp 98.4 °F (36.9 °C) (Temporal)   Ht 5' 5" (1.651 m)   Wt 91.2 kg (201 lb 1 oz)   BMI 33.46 kg/m²   General appearance:  Well developed, well nourished    Ears:  Otoscopy of external auditory canals and tympanic membranes was normal, clinical speech reception thresholds grossly intact, no mass/lesion of auricle.    Nose:  No masses/lesions of external nose, nasal mucosa, septum, and turbinates were within normal limits.    Mouth:  No mass/lesion of lips, teeth, gums, hard/soft palate, tongue, tonsils, or oropharynx. Thick but not purulent saliva left parotid duct    Neck & Lymphatics:  No cervical lymphadenopathy, TTP and notable swelling over left parotid gland, similar but less significant swelling over right parotid gland, trachea is midline, no thyroid enlargement/tenderness/mass.        [x]  Data Reviewed:    Lab Results   Component Value Date    WBC 7.21 2023    HGB 12.8 2023    HCT 39.4 2023    MCV 91 2023    EOSINOPHIL 7.6 2023 "               Assessment & Plan:   Sialoadenitis  -     CBC auto differential; Future; Expected date: 09/21/2023  -     Comprehensive metabolic panel; Future; Expected date: 09/21/2023  -     Sedimentation rate; Future; Expected date: 09/21/2023  -     C-reactive protein; Future; Expected date: 09/21/2023  -     VINOD; Future; Expected date: 09/21/2023  -     Rheumatoid factor; Future; Expected date: 09/21/2023  -     Sjogrens syndrome-A extractable nuclear antibody; Future; Expected date: 09/21/2023    Non-seasonal allergic rhinitis, unspecified trigger        Her left parotid swelling seems to be improving on the amoxicillin.  She also has some right parotid swelling.  This is moderately tender.  We discussed the common causes of sialoadenitis.  I explained that it is uncommon have it bilaterally.  She does have some family history of autoimmune issues, so we agreed on some lab work to rule out other possible causes of salivary gland dysfunction/swelling.  She is going to continue with her antibiotics and also start with massage, warm compresses and increased hydration.  She will follow-up in 2 weeks.

## 2023-09-21 NOTE — PROGRESS NOTES
Patient Information  Name: Columba Romano  : 1985  MRN: [id number]     Referring Physician:  Dr. Abdi ref. provider found   Primary Care Physician:  Joan Zapata MD   Date of Visit: 2023      Subjective:       Columba Romano is a 38 y.o. female who presents for   Chief Complaint   Patient presents with    Spot     Right Face, Nose       Patient here for skin check.     Does patient have a personal hx of skin cancers? no  Does patient have family hx of melanoma?  no  Does patient have hx of strong sun exposure or tanning bed use in the past? yes      Patient was last seen:Visit date not found     Prior notes by myself reviewed.   Clinical documentation obtained by nursing staff reviewed.    Review of Systems   Skin:  Negative for itching and rash.        Objective:    Physical Exam   Constitutional: She appears well-developed and well-nourished. No distress.   Neurological: She is alert and oriented to person, place, and time. She is not disoriented.   Psychiatric: She has a normal mood and affect.   Skin:   Areas Examined (abnormalities noted in diagram):   Scalp / Hair Palpated and Inspected  Head / Face Inspection Performed  Neck Inspection Performed  Chest / Axilla Inspection Performed  Abdomen Inspection Performed  Genitals / Buttocks / Groin Inspection Performed  Back Inspection Performed  RUE Inspected  LUE Inspection Performed  RLE Inspected  LLE Inspection Performed  Nails and Digits Inspection Performed                   Diagram Legend     Erythematous scaling macule/papule c/w actinic keratosis       Vascular papule c/w angioma      Pigmented verrucoid papule/plaque c/w seborrheic keratosis      Yellow umbilicated papule c/w sebaceous hyperplasia      Irregularly shaped tan macule c/w lentigo     1-2 mm smooth white papules consistent with Milia      Movable subcutaneous cyst with punctum c/w epidermal inclusion cyst      Subcutaneous movable cyst c/w pilar cyst      Firm pink to brown  papule c/w dermatofibroma      Pedunculated fleshy papule(s) c/w skin tag(s)      Evenly pigmented macule c/w junctional nevus     Mildly variegated pigmented, slightly irregular-bordered macule c/w mildly atypical nevus      Flesh colored to evenly pigmented papule c/w intradermal nevus       Pink pearly papule/plaque c/w basal cell carcinoma      Erythematous hyperkeratotic cursted plaque c/w SCC      Surgical scar with no sign of skin cancer recurrence      Open and closed comedones      Inflammatory papules and pustules      Verrucoid papule consistent consistent with wart     Erythematous eczematous patches and plaques     Dystrophic onycholytic nail with subungual debris c/w onychomycosis     Umbilicated papule    Erythematous-base heme-crusted tan verrucoid plaque consistent with inflamed seborrheic keratosis     Erythematous Silvery Scaling Plaque c/w Psoriasis     See annotation          [] Data reviewed  [] Independent review of test  [] Management discussed with another provider    Assessment / Plan:      Pathology Orders:       Normal Orders This Visit    Specimen to Pathology, Dermatology     Questions:    Procedure Type: Dermatology and skin neoplasms    Number of Specimens: 1    ------------------------: -------------------------    Spec 1 Procedure: Biopsy    Spec 1 Clinical Impression: IDN, r/o NMSC    Spec 1 Source: right cheek    Release to patient: Immediate          Neoplasm of uncertain behavior of skin  -     Specimen to Pathology, Dermatology  Shave biopsy procedure note:    Shave biopsy performed after verbal consent including risk of infection, scar, recurrence, need for additional treatment of site. Area prepped with alcohol, anesthetized with approximately 1.0cc of 1% lidocaine with epinephrine. Lesional tissue shaved with dermablade. Hemostasis achieved with application of aluminum chloride. No complications. Dressing applied. Wound care explained.    Skin cancer screening  Total body  skin examination performed today including at least 12 points as noted in physical examination. Suspicious lesions noted.    Recommend daily sun protection/avoidance, use of at least SPF 30, broad spectrum sunscreen (OTC drug), skin self examinations, and routine physician surveillance to optimize early detection    Actinic keratoses  Cryosurgery Procedure Note    Verbal consent from the patient is obtained including, but not limited to, risk of hypopigmentation/hyperpigmentation, scar, recurrence of lesion. The patient is aware of the precancerous quality and need for treatment of these lesions. Liquid nitrogen cryosurgery is applied to the 2 actinic keratoses, as detailed in the physical exam, to produce a freeze injury. The patient is aware that blisters may form and is instructed on wound care with gentle cleansing and use of vaseline ointment to keep moist until healed. The patient is supplied a handout on cryosurgery and is instructed to call if lesions do not completely resolve.    Solar lentigo  This is a benign hyperpigmented sun induced lesion. Recommend daily sun protection/avoidance and use of at least SPF 30, broad spectrum sunscreen (OTC drug) will reduce the number of new lesions. Treatment of these benign lesions are considered cosmetic.    Multiple benign nevi  Discussed ABCDE's of nevi.  Monitor for new mole or moles that are becoming bigger, darker, irritated, or developing irregular borders. Brochure provided. Instructed patient to observe lesion(s) for changes and follow up in clinic if changes are noted. Patient to monitor skin at home for new or changing lesions.     Seborrheic keratosis  These are benign inherited growths without a malignant potential. Reassurance given to patient. No treatment is necessary.     Hemangioma, unspecified site  This is a benign vascular lesion. Reassurance given. No treatment required.                    LOS NUMBER AND COMPLEXITY OF PROBLEMS    COMPLEXITY OF  DATA RISK TOTAL TIME (m)   46313  29522 [] 1 self-limited or minor problem [x] Minimal to none [] No treatment recommended or patient to monitor 15-29  10-19 99203  55445 Low  [] 2 or > self limited or minor problems  [] 1 stable chronic illness  [] 1 acute, uncomplicated illness or injury Limited (2)  [] Prior external notes from each unique source  [] Review result of each unique test  [] Order each unique test [x]  Low  OTC medications, minor skin biopsy 30-44  20-29 99204  65012 Moderate  []  1 or > chronic illness with progression, exacerbation or SE of treatment  [x]  2 or more stable chronic illnesses  []  1 acute illness with systemic symptoms  []  1 acute complicated injury  []  1 undiagnosed new problem with uncertain prognosis Moderate (1/3 below)  []  3 or more data items        *Now includes assessment requiring independent historian  []  Independent interpretation of a test  []  Discuss management/test with another provider Moderate  []  Prescription drug mgmt  []  Minor surgery with risk discussed  []  Mgmt limited by social determinates 45-59  30-39   42166  48603 High  []  1 or more chronic illness with severe exacerbation, progression or SE of treatment  []  1 acute or chronic illness/injury that poses a threat to life or bodily function Extensive (2/3 below)  []  3 or more data items        *Now includes assessment requiring independent historian.  []  Independent interpretation of a test  []  Discuss management/test with another provider High  []  Major surgery with risk discussed  []  Drug therapy requiring intensive monitoring for toxicity  []  Hospitalization  []  Decision for DNR 60-74  40-54      No follow-ups on file.    Ariadna Bhatti MD, FAAD  [facility]

## 2023-09-21 NOTE — TELEPHONE ENCOUNTER
----- Message from Elvi Moran MA sent at 9/21/2023  1:31 PM CDT -----  Contact: [phone]  Patient called              In regards to returning a phone call back to staff.            Call back [phone]

## 2023-09-21 NOTE — PATIENT INSTRUCTIONS

## 2023-09-22 LAB
ANA PATTERN 1: NORMAL
ANA SER QL IF: POSITIVE
ANA TITR SER IF: NORMAL {TITER}
BASOPHILS # BLD AUTO: 0.08 K/UL (ref 0–0.2)
BASOPHILS NFR BLD: 0.7 % (ref 0–1.9)
DIFFERENTIAL METHOD: ABNORMAL
EOSINOPHIL # BLD AUTO: 0.1 K/UL (ref 0–0.5)
EOSINOPHIL NFR BLD: 1.1 % (ref 0–8)
ERYTHROCYTE [DISTWIDTH] IN BLOOD BY AUTOMATED COUNT: 12.8 % (ref 11.5–14.5)
HCT VFR BLD AUTO: 39.1 % (ref 37–48.5)
HGB BLD-MCNC: 12.2 G/DL (ref 12–16)
IMM GRANULOCYTES # BLD AUTO: 0.17 K/UL (ref 0–0.04)
IMM GRANULOCYTES NFR BLD AUTO: 1.5 % (ref 0–0.5)
LYMPHOCYTES # BLD AUTO: 1.6 K/UL (ref 1–4.8)
LYMPHOCYTES NFR BLD: 14.3 % (ref 18–48)
MCH RBC QN AUTO: 29.6 PG (ref 27–31)
MCHC RBC AUTO-ENTMCNC: 31.2 G/DL (ref 32–36)
MCV RBC AUTO: 95 FL (ref 82–98)
MONOCYTES # BLD AUTO: 0.6 K/UL (ref 0.3–1)
MONOCYTES NFR BLD: 5.2 % (ref 4–15)
NEUTROPHILS # BLD AUTO: 8.8 K/UL (ref 1.8–7.7)
NEUTROPHILS NFR BLD: 77.2 % (ref 38–73)
NRBC BLD-RTO: 0 /100 WBC
PLATELET # BLD AUTO: 243 K/UL (ref 150–450)
PMV BLD AUTO: 10.7 FL (ref 9.2–12.9)
RBC # BLD AUTO: 4.12 M/UL (ref 4–5.4)
RHEUMATOID FACT SERPL-ACNC: <13 IU/ML (ref 0–15)
WBC # BLD AUTO: 11.33 K/UL (ref 3.9–12.7)

## 2023-09-25 LAB
ANTI SM ANTIBODY: 0.08 RATIO (ref 0–0.99)
ANTI SM/RNP ANTIBODY: 0.09 RATIO (ref 0–0.99)
ANTI-SM INTERPRETATION: NEGATIVE
ANTI-SM/RNP INTERPRETATION: NEGATIVE
ANTI-SSA ANTIBODY: 0.09 RATIO (ref 0–0.99)
ANTI-SSA ANTIBODY: 0.09 RATIO (ref 0–0.99)
ANTI-SSA INTERPRETATION: NEGATIVE
ANTI-SSA INTERPRETATION: NEGATIVE
ANTI-SSB ANTIBODY: 0.16 RATIO (ref 0–0.99)
ANTI-SSB INTERPRETATION: NEGATIVE
DSDNA AB SER-ACNC: NORMAL [IU]/ML

## 2023-09-27 ENCOUNTER — OFFICE VISIT (OUTPATIENT)
Dept: OTOLARYNGOLOGY | Facility: CLINIC | Age: 38
End: 2023-09-27
Payer: OTHER GOVERNMENT

## 2023-09-27 VITALS — WEIGHT: 196.19 LBS | HEIGHT: 65 IN | BODY MASS INDEX: 32.69 KG/M2

## 2023-09-27 DIAGNOSIS — K21.9 LARYNGOPHARYNGEAL REFLUX (LPR): ICD-10-CM

## 2023-09-27 DIAGNOSIS — K11.20 SIALOADENITIS: ICD-10-CM

## 2023-09-27 DIAGNOSIS — J30.89 NON-SEASONAL ALLERGIC RHINITIS, UNSPECIFIED TRIGGER: Primary | ICD-10-CM

## 2023-09-27 LAB
FINAL PATHOLOGIC DIAGNOSIS: NORMAL
GROSS: NORMAL
Lab: NORMAL
MICROSCOPIC EXAM: NORMAL

## 2023-09-27 PROCEDURE — 99214 PR OFFICE/OUTPT VISIT, EST, LEVL IV, 30-39 MIN: ICD-10-PCS | Mod: S$PBB,,, | Performed by: OTOLARYNGOLOGY

## 2023-09-27 PROCEDURE — 99214 OFFICE O/P EST MOD 30 MIN: CPT | Mod: S$PBB,,, | Performed by: OTOLARYNGOLOGY

## 2023-09-27 PROCEDURE — 99213 OFFICE O/P EST LOW 20 MIN: CPT | Mod: PBBFAC | Performed by: OTOLARYNGOLOGY

## 2023-09-27 PROCEDURE — 99999 PR PBB SHADOW E&M-EST. PATIENT-LVL III: CPT | Mod: PBBFAC,,, | Performed by: OTOLARYNGOLOGY

## 2023-09-27 PROCEDURE — 99999 PR PBB SHADOW E&M-EST. PATIENT-LVL III: ICD-10-PCS | Mod: PBBFAC,,, | Performed by: OTOLARYNGOLOGY

## 2023-09-27 RX ORDER — FLUTICASONE PROPIONATE 50 MCG
2 SPRAY, SUSPENSION (ML) NASAL DAILY
Qty: 16 G | Refills: 5 | Status: SHIPPED | OUTPATIENT
Start: 2023-09-27

## 2023-09-27 NOTE — PROGRESS NOTES
"Referring Provider:    No referring provider defined for this encounter.  Subjective:   Patient: Columba Romano [id number], :1985   Visit date:2023 12:52 PM    Chief Complaint:  Follow-up (Follow up on sialoadenitis. Patient c/o pressure in the face. Patient went to the ER over the weekend for elevated blood pressure.)    HPI:    Prior notes reviewed by myself.  Clinical documentation obtained by nursing staff reviewed.     39 y/o female here for evaluation of bilateral parotid swelling left greater than right.  She first noticed this over the weekend and then went to  on Monday at which time she was given a round of amoxicillin, steroids.  She feels that it has fluctuated in size since that time and has not resolved.  She has also been trying some sialogogues.      23 update:  Her parotid swelling has essentially resolved, here today for re-evaluation and review of her labwork.      Objective:     Physical Exam:  Vitals:  Ht 5' 5" (1.651 m)   Wt 89 kg (196 lb 3.4 oz)   BMI 32.65 kg/m²   General appearance:  Well developed, well nourished    Ears:  Otoscopy of external auditory canals and tympanic membranes was normal, clinical speech reception thresholds grossly intact, no mass/lesion of auricle.    Nose:  No masses/lesions of external nose, nasal mucosa, septum, and turbinates were within normal limits.    Mouth:  No mass/lesion of lips, teeth, gums, hard/soft palate, tongue, tonsils, or oropharynx. Thick but not purulent saliva left parotid duct    Neck & Lymphatics:  No cervical lymphadenopathy, TTP and notable swelling over left parotid gland, similar but less significant swelling over right parotid gland, trachea is midline, no thyroid enlargement/tenderness/mass.        [x]  Data Reviewed:    Lab Results   Component Value Date    WBC 11.33 2023    HGB 12.2 2023    HCT 39.1 2023    MCV 95 2023    EOSINOPHIL 1.1 2023        Results    Collected Updated Procedure "    09/21/2023 1320 09/22/2023 1722 VINOD Pattern 1 [[id number]] Component Value   VINOD PATTERN 1 Homogeneous          09/21/2023 1320 09/25/2023 1603 VINOD Profile [[id number]] Component Value Units   Anti Sm Antibody 0.08 Ratio   Anti-Sm Interpretation Negative    Anti-SSA Antibody 0.09 Ratio   Anti-SSA Interpretation Negative    Anti-SSB Antibody 0.16 Ratio   Anti-SSB Interpretation Negative    ds DNA Ab Negative 1:10     Anti Sm/RNP Antibody 0.09 Ratio   Anti-Sm/RNP Interpretation Negative           09/21/2023 1320 09/22/2023 1722 VINOD Titer 1 [[id number]] Component Value   VINOD Titer 1 1:160          09/21/2023 1320 09/22/2023 0026 CBC auto differential [[id number]]   (Abnormal)   Blood    Component Value Units   WBC 11.33 K/uL   RBC 4.12 M/uL   Hemoglobin 12.2 g/dL   Hematocrit 39.1 %   MCV 95 fL   MCH 29.6 pg   MCHC 31.2 Low  g/dL   RDW 12.8 %   Platelets 243 K/uL   MPV 10.7 fL   Immature Granulocytes 1.5 High  %   Gran # (ANC) 8.8 High  K/uL   Immature Grans (Abs) 0.17 High   K/uL   Lymph # 1.6 K/uL   Mono # 0.6 K/uL   Eos # 0.1 K/uL   Baso # 0.08 K/uL   nRBC 0 /100 WBC   Gran % 77.2 High  %   Lymph % 14.3 Low  %   Mono % 5.2 %   Eosinophil % 1.1 %   Basophil % 0.7 %   Differential Method Automated           09/21/2023 1320 09/21/2023 2229 Comprehensive metabolic panel [[id number]]   (Abnormal)   Blood    Component Value Units   Sodium 136 mmol/L   Potassium 4.0 mmol/L   Chloride 106 mmol/L   CO2 22 Low  mmol/L   Glucose 98 mg/dL   BUN 13 mg/dL   Creatinine 0.7 mg/dL   Calcium 9.3 mg/dL   Total Protein 6.7 g/dL   Albumin 3.6 g/dL   Total Bilirubin 0.1  mg/dL   Alkaline Phosphatase 82 U/L   AST 54 High  U/L   ALT 72 High  U/L   eGFR >60.0 mL/min/1.73 m^2   Anion Gap 8 mmol/L          09/21/2023 1320 09/21/2023 1507 Sedimentation rate [[id number]]   Blood    Component Value Units   Sed Rate 10 mm/Hr          09/21/2023 1320 09/21/2023 2229 C-reactive protein [[id number]]   Blood    Component Value Units   CRP 1.6 mg/L           09/21/2023 1320 09/22/2023 1721 VINOD [[id number]]   (Abnormal)   Blood    Component Value   VINOD Screen Positive Abnormal            09/21/2023 1320 09/22/2023 1812 Rheumatoid factor [[id number]]   Blood    Component Value Units   Rheumatoid Factor <13.0 IU/mL          09/21/2023 1320 09/25/2023 1603 Sjogrens syndrome-A extractable nuclear antibody [[id number]]    Blood    Component Value Units   Anti-SSA Antibody 0.09 Ratio   Anti-SSA Interpretation Negative                     Assessment & Plan:   Non-seasonal allergic rhinitis, unspecified trigger  -     fluticasone propionate (FLONASE) 50 mcg/actuation nasal spray; 2 sprays (100 mcg total) by Each Nostril route once daily.  Dispense: 16 g; Refill: 5    Sialoadenitis    Laryngopharyngeal reflux (LPR)          Her left parotid swelling seems to be improving on the amoxicillin.  She also has some right parotid swelling.  This is moderately tender.  We discussed the common causes of sialoadenitis.  I explained that it is uncommon have it bilaterally.  She does have some family history of autoimmune issues, so we agreed on some lab work to rule out other possible causes of salivary gland dysfunction/swelling.  She is going to continue with her antibiotics and also start with massage, warm compresses and increased hydration.  She will follow-up in 2 weeks.    9/27/23 update:  Her previously noted symptoms have improved.  We reviewed her lab work which did demonstrate a positive VINOD and elevated liver function tests. Other autoimmune screening tests were negative.    She recently stopped drinking alcohol and she feels that this likely was the reason behind the increased liver function tests.  She had a recent ER visit for an unrelated reason which did already show improvement in her LFTs.  She does have chronic sinonasal allergy symptoms including congestion, rhinorrhea, postnasal drip.  I encouraged her to start Flonase on a daily basis.  She will follow-up for  re-evaluation in 6 months.

## 2024-01-16 ENCOUNTER — TELEPHONE (OUTPATIENT)
Dept: OBSTETRICS AND GYNECOLOGY | Facility: CLINIC | Age: 39
End: 2024-01-16
Payer: OTHER GOVERNMENT

## 2024-01-16 NOTE — TELEPHONE ENCOUNTER
----- Message from Praneeth Edouard sent at 1/16/2024  9:51 AM CST -----  Contact: Columba Garcia is calling in regards to getting a call back to reschedule the appt on 01/17 before July if possible due to the weather.  Please call back [phone]      Thanks

## 2024-01-19 ENCOUNTER — PATIENT MESSAGE (OUTPATIENT)
Dept: OBSTETRICS AND GYNECOLOGY | Facility: CLINIC | Age: 39
End: 2024-01-19
Payer: OTHER GOVERNMENT

## 2024-02-22 DIAGNOSIS — K21.9 GASTROESOPHAGEAL REFLUX DISEASE, UNSPECIFIED WHETHER ESOPHAGITIS PRESENT: ICD-10-CM

## 2024-02-22 RX ORDER — OMEPRAZOLE 40 MG/1
CAPSULE, DELAYED RELEASE ORAL
Qty: 30 CAPSULE | Refills: 0 | Status: SHIPPED | OUTPATIENT
Start: 2024-02-22 | End: 2024-02-22

## 2024-02-22 RX ORDER — OMEPRAZOLE 40 MG/1
CAPSULE, DELAYED RELEASE ORAL
Qty: 30 CAPSULE | Refills: 0 | Status: SHIPPED | OUTPATIENT
Start: 2024-02-22 | End: 2024-05-30

## 2024-02-22 NOTE — TELEPHONE ENCOUNTER
Care Due:                  Date            Visit Type   Department     Provider  --------------------------------------------------------------------------------                                ESTABLISHED                              PATIENT -    [facility] INTERNAL  Joan Tabitha  Last Visit: 09-      [facility]       [place]  Next Visit: None Scheduled  None         None Found                                                            Last  Test          Frequency    Reason                     Performed    Due Date  --------------------------------------------------------------------------------    Office Visit  15 months..  omeprazole...............  09- 11-    [facility] Embedded Care Due Messages. Reference number: [id number].   2/22/2024 3:41:14 AM CST

## 2024-02-22 NOTE — TELEPHONE ENCOUNTER
No care due was identified.  [facility] Embedded Care Due Messages. Reference number: [id number].   2/22/2024 10:31:42 AM CST

## 2024-03-01 DIAGNOSIS — R06.2 WHEEZING: Primary | ICD-10-CM

## 2024-04-25 ENCOUNTER — HOSPITAL ENCOUNTER (OUTPATIENT)
Dept: RADIOLOGY | Facility: HOSPITAL | Age: 39
Discharge: HOME | End: 2024-04-25
Attending: FAMILY MEDICINE
Payer: OTHER GOVERNMENT

## 2024-04-25 ENCOUNTER — OFFICE VISIT (OUTPATIENT)
Dept: INTERNAL MEDICINE | Facility: CLINIC | Age: 39
End: 2024-04-25
Payer: OTHER GOVERNMENT

## 2024-04-25 VITALS
HEIGHT: 65 IN | BODY MASS INDEX: 31.36 KG/M2 | HEART RATE: 94 BPM | TEMPERATURE: 98 F | WEIGHT: 188.25 LBS | DIASTOLIC BLOOD PRESSURE: 80 MMHG | OXYGEN SATURATION: 99 % | SYSTOLIC BLOOD PRESSURE: 120 MMHG | RESPIRATION RATE: 18 BRPM

## 2024-04-25 DIAGNOSIS — M79.645 FINGER PAIN, LEFT: ICD-10-CM

## 2024-04-25 DIAGNOSIS — M25.561 RIGHT ANTERIOR KNEE PAIN: ICD-10-CM

## 2024-04-25 DIAGNOSIS — M79.645 FINGER PAIN, LEFT: Primary | ICD-10-CM

## 2024-04-25 DIAGNOSIS — E66.9 OBESITY (BMI 30.0-34.9): ICD-10-CM

## 2024-04-25 PROCEDURE — 73140 X-RAY EXAM OF FINGER(S): CPT | Mod: TC,LT

## 2024-04-25 PROCEDURE — 99214 OFFICE O/P EST MOD 30 MIN: CPT | Mod: S$PBB,,, | Performed by: FAMILY MEDICINE

## 2024-04-25 PROCEDURE — 99999 PR PBB SHADOW E&M-EST. PATIENT-LVL V: CPT | Mod: PBBFAC,,, | Performed by: FAMILY MEDICINE

## 2024-04-25 PROCEDURE — 73140 X-RAY EXAM OF FINGER(S): CPT | Mod: 26,LT,, | Performed by: RADIOLOGY

## 2024-04-25 PROCEDURE — 73562 X-RAY EXAM OF KNEE 3: CPT | Mod: TC,RT

## 2024-04-25 PROCEDURE — 73562 X-RAY EXAM OF KNEE 3: CPT | Mod: 26,RT,, | Performed by: RADIOLOGY

## 2024-04-25 PROCEDURE — 99215 OFFICE O/P EST HI 40 MIN: CPT | Mod: PBBFAC | Performed by: FAMILY MEDICINE

## 2024-04-25 NOTE — PROGRESS NOTES
"Subjective:       Patient ID: Columba Romano is a 38 y.o. female.    Chief Complaint: Hand Pain    38-year-old female patient with Patient Active Problem List:     Gastroesophageal reflux disease     Post-cholecystectomy syndrome     Obesity (BMI 30.0-34.9)     Bicuspid aortic valve     Aortic valve regurgitation     DDD (degenerative disc disease), cervical     Fibrocystic disease of breast     Migraine with aura     Seasonal allergic rhinitis     Simple renal cyst  Here with complaint of having pain to the left index finger, having difficulty flexing it and noted nodule for the past 1 month  Denies any family history of rheumatoid arthritis  Reports that she felt a pop to the right knee and started noticing bruising since yesterday, does not recall having any injury or trauma      Review of Systems   Constitutional:  Negative for fatigue.   Eyes:  Negative for visual disturbance.   Respiratory:  Negative for shortness of breath.    Cardiovascular:  Negative for chest pain and leg swelling.   Gastrointestinal:  Negative for abdominal pain, nausea and vomiting.   Musculoskeletal:  Positive for arthralgias, joint swelling and myalgias.   Skin:  Positive for color change. Negative for rash.   Neurological:  Negative for weakness, light-headedness, numbness and headaches.   Psychiatric/Behavioral:  Negative for sleep disturbance.          /80 (BP Location: Left arm, Patient Position: Sitting, BP Method: Medium (Manual))   Pulse 94   Temp 98.3 °F (36.8 °C) (Tympanic)   Resp 18   Ht 5' 5" (1.651 m)   Wt 85.4 kg (188 lb 4.4 oz)   LMP 03/31/2024 (Exact Date)   SpO2 99%   BMI 31.33 kg/m²   Objective:      Physical Exam  Constitutional:       Appearance: She is well-developed.   HENT:      Head: Normocephalic and atraumatic.   Cardiovascular:      Rate and Rhythm: Normal rate and regular rhythm.      Heart sounds: Normal heart sounds. No murmur heard.  Pulmonary:      Effort: Pulmonary effort is normal.      " Breath sounds: Normal breath sounds. No wheezing.   Abdominal:      General: Bowel sounds are normal.      Palpations: Abdomen is soft.      Tenderness: There is no abdominal tenderness.   Musculoskeletal:         General: Tenderness present.      Comments: Positive for tenderness to the left index finger and noted subcutaneous nodule medially  Positive for tenderness to the right knee anteriorly with bruising   Skin:     General: Skin is warm and dry.      Findings: Bruising present. No rash.   Neurological:      Mental Status: She is alert and oriented to person, place, and time.   Psychiatric:         Mood and Affect: Mood normal.           Assessment/Plan:   1. Finger pain, left  -     X-Ray Finger 2 or More Views Left; Future; Expected date: 04/25/2024  -     Comprehensive Metabolic Panel; Future; Expected date: 04/25/2024  -     Basic Metabolic Panel; Future; Expected date: 04/25/2024  -     RHEUMATOID FACTOR; Future; Expected date: 04/25/2024  -     CYCLIC CITRUL PEPTIDE ANTIBODY, IGG; Future; Expected date: 04/25/2024  Will get x-ray of the left index finger and will check further labs to rule out any underlying infection versus rheumatoid arthritis  Over-the-counter Tylenol recommended for symptomatic relief    2. Right anterior knee pain  -     X-Ray Knee 3 View Right; Future; Expected date: 04/25/2024  Will get x-ray of the right knee for further evaluation  Cool compresses recommended    3. Obesity (BMI 30.0-34.9)  Lifestyle modifications recommended to lose weight with BMI 31

## 2024-04-26 ENCOUNTER — PATIENT MESSAGE (OUTPATIENT)
Dept: INTERNAL MEDICINE | Facility: CLINIC | Age: 39
End: 2024-04-26
Payer: OTHER GOVERNMENT

## 2024-04-26 DIAGNOSIS — M79.645 FINGER PAIN, LEFT: Primary | ICD-10-CM

## 2024-04-26 RX ORDER — CEPHALEXIN 500 MG/1
500 CAPSULE ORAL EVERY 12 HOURS
Qty: 14 CAPSULE | Refills: 0 | Status: SHIPPED | OUTPATIENT
Start: 2024-04-26

## 2024-04-28 DIAGNOSIS — R04.2 COUGH WITH HEMOPTYSIS: ICD-10-CM

## 2024-04-29 RX ORDER — ALBUTEROL SULFATE 90 UG/1
AEROSOL, METERED RESPIRATORY (INHALATION)
Qty: 54 G | Refills: 2 | OUTPATIENT
Start: 2024-04-29

## 2024-05-06 ENCOUNTER — HOSPITAL ENCOUNTER (OUTPATIENT)
Dept: RADIOLOGY | Facility: HOSPITAL | Age: 39
Discharge: HOME | End: 2024-05-06
Attending: NURSE PRACTITIONER
Payer: OTHER GOVERNMENT

## 2024-05-06 ENCOUNTER — OFFICE VISIT (OUTPATIENT)
Dept: PULMONOLOGY | Facility: CLINIC | Age: 39
End: 2024-05-06
Payer: OTHER GOVERNMENT

## 2024-05-06 VITALS
DIASTOLIC BLOOD PRESSURE: 80 MMHG | BODY MASS INDEX: 30.97 KG/M2 | WEIGHT: 185.88 LBS | HEIGHT: 65 IN | HEART RATE: 93 BPM | OXYGEN SATURATION: 99 % | RESPIRATION RATE: 16 BRPM | SYSTOLIC BLOOD PRESSURE: 120 MMHG

## 2024-05-06 DIAGNOSIS — F17.200 SMOKER: ICD-10-CM

## 2024-05-06 DIAGNOSIS — R06.2 WHEEZING: ICD-10-CM

## 2024-05-06 DIAGNOSIS — G47.30 SLEEP DISORDER BREATHING: ICD-10-CM

## 2024-05-06 DIAGNOSIS — J30.81 ALLERGIC RHINITIS DUE TO ANIMAL (CAT) (DOG) HAIR AND DANDER: ICD-10-CM

## 2024-05-06 DIAGNOSIS — R06.2 WHEEZING: Primary | ICD-10-CM

## 2024-05-06 PROCEDURE — 71046 X-RAY EXAM CHEST 2 VIEWS: CPT | Mod: TC

## 2024-05-06 PROCEDURE — 99204 OFFICE O/P NEW MOD 45 MIN: CPT | Mod: S$PBB,,, | Performed by: NURSE PRACTITIONER

## 2024-05-06 PROCEDURE — 99213 OFFICE O/P EST LOW 20 MIN: CPT | Mod: PBBFAC,25 | Performed by: NURSE PRACTITIONER

## 2024-05-06 PROCEDURE — 71046 X-RAY EXAM CHEST 2 VIEWS: CPT | Mod: 26,,, | Performed by: RADIOLOGY

## 2024-05-06 PROCEDURE — 99999 PR PBB SHADOW E&M-EST. PATIENT-LVL III: CPT | Mod: PBBFAC,,, | Performed by: NURSE PRACTITIONER

## 2024-05-06 NOTE — PROGRESS NOTES
Subjective:      Patient ID: Columba Romano is a 38 y.o. female.    Chief Complaint: Sleep Apnea    HPI    Patient presents today for evaluation of sleep apnea, coughing, wheezing.    Patient with snoring and witnessed apneas. Patient not having problems falling asleep, but wakes up frequently throughout the night.  Patient does not wake up feeling refreshed in the morning.  Patient with daytime hypersomnolence.  Patient has had symptoms for a few years. Comorbidities include aortic valve regurgitation.SVTs noted at night on holter. Cardiologist referred for sleep study. Wakes up gasping with palpitations at times.   Bedtime: 8PM  Wake time: 6:30AM  Presents for coughing/wheezing. She has 26 yr hx of smoking. Up to a pack a day. Now down to 1/4pk day. Motivated to quit. She is seeing allergist. Allergic to her dog.   She had advair in the past. Not taking. Taking albuterol a few times a month    STOP - BANG Questionnaire:     1. Snoring : Do you snore loudly ?    Yes    2. Tired : Do you often feel tired, fatigued, or sleepy during daytime?   Yes    3. Observed: Has anyone observed you stop breathing during your sleep?   Yes    4. Blood pressure : Do you have or are you being treated for high blood pressure?   No    5. BMI :BMI more than 35 kg/m2?   No    6. Age : Age over 50 yr old?   No    7. Neck circumference: Neck circumference greater than 40 cm?   No    8. Gender: Gender male?   No    High risk of MERCY: Yes 5 - 8  Intermediate risk of MERCY: Yes 3 - 4  Low risk of MERCY: Yes 0 - 2      References:   STOP Questionnaire   A Tool to Screen Patients for Obstructive Sleep Apnea: PENNY Vyas.R.C.P.C., DALTON Childress.B.B.S., Giovany Copeland M.D.,Esperanza Gunderson, Ph.D., DALTON Suero.B.B.S.,_ Pilo Marina.,_ Les Fonseca M.D., [employer] F.R.C.P.C.; Anesthesiology 2008; 108:812-21 Copyright © 2008, the American Society of Anesthesiologists, Inc. [employer] Inc[employer]      "  [place] Questionnaire (validated MERCY screening questionnaire)    Yes -- Snoring/apnea    Yes -- Fatigue    Body mass index is Body mass index is 30.93 kg/m²..  (>25 is overweight, >30 is obese)    Blood Pressure = normal blood pressure  (PreHTN 120-139/80-89, Stg1 140-159/90-99, Stg2 >160/>100)  [place] = three of three MERCY categories are positive (high risk is 2-3 positive categories)         5/6/2024    10:36 AM   EPWORTH SLEEPINESS SCALE   Sitting and reading 1   Watching TV 1   Sitting, inactive in a public place (e.g. a theatre or a meeting) 0   As a passenger in a car for an hour without a break 0   Lying down to rest in the afternoon when circumstances permit 2   Sitting and talking to someone 0   Sitting quietly after a lunch without alcohol 0   In a car, while stopped for a few minutes in traffic 0   Total score 4      (validated sleepiness questionnaire with a higher score indicating greater sleepiness; range 0-24)    Patient Active Problem List   Diagnosis    Gastroesophageal reflux disease    Post-cholecystectomy syndrome    Obesity (BMI 30.0-34.9)    Bicuspid aortic valve    Aortic valve regurgitation    DDD (degenerative disc disease), cervical    Fibrocystic disease of breast    Migraine with aura    Seasonal allergic rhinitis    Simple renal cyst         /80   Pulse 93   Resp 16   Ht 5' 5" (1.651 m)   Wt 84.3 kg (185 lb 13.6 oz)   LMP 03/31/2024 (Exact Date)   SpO2 99%   BMI 30.93 kg/m²   Body mass index is 30.93 kg/m².    Review of Systems   Constitutional:  Positive for fatigue.   Respiratory:  Positive for snoring, wheezing and somnolence.    Psychiatric/Behavioral:  Positive for sleep disturbance.    All other systems reviewed and are negative.        Objective:      Physical Exam  Constitutional:       Appearance: She is well-developed. She is obese.   HENT:      Head: Normocephalic and atraumatic.      Nose: Nose normal.      Mouth/Throat:      Comments: Mallampati Score: " III    Cardiovascular:      Rate and Rhythm: Normal rate and regular rhythm.      Heart sounds: No murmur heard.     No gallop.   Pulmonary:      Effort: Pulmonary effort is normal.      Breath sounds: Normal breath sounds.   Abdominal:      Palpations: Abdomen is soft.      Tenderness: There is no abdominal tenderness.   Musculoskeletal:         General: Normal range of motion.      Cervical back: Normal range of motion and neck supple.   Skin:     General: Skin is warm and dry.   Neurological:      Mental Status: She is alert and oriented to person, place, and time.   Psychiatric:         Mood and Affect: Mood normal.         Behavior: Behavior normal.       Personal Diagnostic Review  Personally reviewed.   X-Ray Chest PA And Lateral  Narrative: EXAMINATION:  XR CHEST PA AND LATERAL    CLINICAL HISTORY:  Wheezing    TECHNIQUE:  PA and lateral views of the chest were performed.    COMPARISON:  Prior radiographs    FINDINGS:  Cardiac silhouette and mediastinal contours are normal.  Lungs are clear.  Osseous structures are intact.  Impression: No acute cardiopulmonary process.    Electronically signed by: Hernesto Shah MD  Date:    05/06/2024  Time:    10:29        Assessment:     1. Wheezing    2. Allergic rhinitis due to animal (cat) (dog) hair and dander    3. Sleep disorder breathing    4. Smoker       Outpatient Encounter Medications as of 5/6/2024   Medication Sig Dispense Refill    albuterol (PROVENTIL/VENTOLIN HFA) 90 mcg/actuation inhaler INHALE 2 PUFFS INTO THE LUNGS EVERY 6 HOURS AS NEEDED FOR WHEEZING OR RESCUE 54 g 2    cephALEXin (KEFLEX) 500 MG capsule Take 1 capsule (500 mg total) by mouth every 12 (twelve) hours. 14 capsule 0    fluticasone propionate (FLONASE) 50 mcg/actuation nasal spray 2 sprays (100 mcg total) by Each Nostril route once daily. 16 g 5    hydrocortisone 2.5 % cream Apply topically 2 (two) times daily. Mix with ketoconazole cream and AAA on groin BID for up to 2 weeks at a time  30 g 1    ketoconazole (NIZORAL) 2 % cream Apply topically 2 (two) times daily. Mix with hydrocortisone cream and AAA on groin BID for up to 2 weeks at a time 30 g 1    loratadine (CLARITIN) 10 mg tablet Take 10 mg by mouth once daily.      multivitamin capsule Take 1 capsule by mouth once daily.      omeprazole (PRILOSEC) 40 MG capsule TAKE 1 CAPSULE(40 MG) BY MOUTH EVERY DAY 30 capsule 0    [DISCONTINUED] fluticasone-salmeterol 230-21 mcg/dose (ADVAIR HFA) 230-21 mcg/actuation HFAA inhaler Inhale 2 puffs into the lungs 2 (two) times daily. Controller 12 g 5    [DISCONTINUED] montelukast (SINGULAIR) 10 mg tablet TAKE 1 TABLET(10 MG) BY MOUTH EVERY EVENING 90 tablet 1     No facility-administered encounter medications on file as of 2024.     Orders Placed This Encounter   Procedures    Complete PFT with bronchodilator     Standing Status:   Future     Standing Expiration Date:   2025     Order Specific Question:   Release to patient     Answer:   Immediate    Fraction of  Nitric Oxide     Standing Status:   Future     Standing Expiration Date:   2025     Order Specific Question:   Release to patient     Answer:   Immediate    Polysomnogram (CPAP will be added if patient meets diagnostic criteria.)     Standing Status:   Future     Standing Expiration Date:   2025     Plan:     1. Wheezing  -     Complete PFT with bronchodilator; Future  -     Fraction of  Nitric Oxide; Future    2. Allergic rhinitis due to animal (cat) (dog) hair and dander  Comments:  Continue to follow up with allergist.    3. Sleep disorder breathing  -     Polysomnogram (CPAP will be added if patient meets diagnostic criteria.); Future    4. Smoker  Comments:  Baseline PFT. disucssed smoking cessation

## 2024-05-30 DIAGNOSIS — K21.9 GASTROESOPHAGEAL REFLUX DISEASE, UNSPECIFIED WHETHER ESOPHAGITIS PRESENT: ICD-10-CM

## 2024-05-30 RX ORDER — OMEPRAZOLE 40 MG/1
CAPSULE, DELAYED RELEASE ORAL
Qty: 90 CAPSULE | Refills: 3 | Status: SHIPPED | OUTPATIENT
Start: 2024-05-30

## 2024-05-30 NOTE — TELEPHONE ENCOUNTER
Refill Decision Note   Columba Juan Antonio  is requesting a refill authorization.  Brief Assessment and Rationale for Refill:  Approve     Medication Therapy Plan:        Comments:     Note composed:11:09 AM 05/30/2024

## 2024-05-30 NOTE — TELEPHONE ENCOUNTER
No care due was identified.  [facility] Embedded Care Due Messages. Reference number: [id number].   5/30/2024 3:40:07 AM CDT

## 2024-06-11 ENCOUNTER — PATIENT MESSAGE (OUTPATIENT)
Dept: PULMONOLOGY | Facility: CLINIC | Age: 39
End: 2024-06-11
Payer: OTHER GOVERNMENT

## 2024-07-30 ENCOUNTER — TELEPHONE (OUTPATIENT)
Dept: OBSTETRICS AND GYNECOLOGY | Facility: CLINIC | Age: 39
End: 2024-07-30
Payer: OTHER GOVERNMENT

## 2024-07-30 NOTE — TELEPHONE ENCOUNTER
Attempted to contact patient due to dr. BARON simpson having to cancel clinic due to surgery, no answer. Left patient voice mail to return call to clinic.

## 2024-08-26 ENCOUNTER — OFFICE VISIT (OUTPATIENT)
Dept: OBSTETRICS AND GYNECOLOGY | Facility: CLINIC | Age: 39
End: 2024-08-26
Payer: OTHER GOVERNMENT

## 2024-08-26 ENCOUNTER — LAB VISIT (OUTPATIENT)
Dept: LAB | Facility: HOSPITAL | Age: 39
End: 2024-08-26
Payer: OTHER GOVERNMENT

## 2024-08-26 VITALS — BODY MASS INDEX: 31.81 KG/M2 | HEIGHT: 65 IN | WEIGHT: 190.94 LBS

## 2024-08-26 DIAGNOSIS — N92.6 IRREGULAR MENSTRUAL CYCLE: ICD-10-CM

## 2024-08-26 DIAGNOSIS — Z12.4 CERVICAL CANCER SCREENING: ICD-10-CM

## 2024-08-26 DIAGNOSIS — Z11.3 SCREEN FOR STD (SEXUALLY TRANSMITTED DISEASE): ICD-10-CM

## 2024-08-26 DIAGNOSIS — L73.9 FOLLICULITIS: ICD-10-CM

## 2024-08-26 DIAGNOSIS — Z01.419 ENCOUNTER FOR ANNUAL ROUTINE GYNECOLOGICAL EXAMINATION: Primary | ICD-10-CM

## 2024-08-26 LAB
BASOPHILS # BLD AUTO: 0.07 K/UL (ref 0–0.2)
BASOPHILS NFR BLD: 0.8 % (ref 0–1.9)
DIFFERENTIAL METHOD BLD: ABNORMAL
EOSINOPHIL # BLD AUTO: 0.6 K/UL (ref 0–0.5)
EOSINOPHIL NFR BLD: 7 % (ref 0–8)
ERYTHROCYTE [DISTWIDTH] IN BLOOD BY AUTOMATED COUNT: 12.8 % (ref 11.5–14.5)
FSH SERPL-ACNC: 4.16 MIU/ML
HCT VFR BLD AUTO: 39.4 % (ref 37–48.5)
HGB BLD-MCNC: 12.4 G/DL (ref 12–16)
IMM GRANULOCYTES # BLD AUTO: 0.06 K/UL (ref 0–0.04)
IMM GRANULOCYTES NFR BLD AUTO: 0.7 % (ref 0–0.5)
LYMPHOCYTES # BLD AUTO: 1.9 K/UL (ref 1–4.8)
LYMPHOCYTES NFR BLD: 21.1 % (ref 18–48)
MCH RBC QN AUTO: 29.2 PG (ref 27–31)
MCHC RBC AUTO-ENTMCNC: 31.5 G/DL (ref 32–36)
MCV RBC AUTO: 93 FL (ref 82–98)
MONOCYTES # BLD AUTO: 0.7 K/UL (ref 0.3–1)
MONOCYTES NFR BLD: 7.4 % (ref 4–15)
NEUTROPHILS # BLD AUTO: 5.6 K/UL (ref 1.8–7.7)
NEUTROPHILS NFR BLD: 63 % (ref 38–73)
NRBC BLD-RTO: 0 /100 WBC
PLATELET # BLD AUTO: 236 K/UL (ref 150–450)
PMV BLD AUTO: 11.1 FL (ref 9.2–12.9)
RBC # BLD AUTO: 4.24 M/UL (ref 4–5.4)
TSH SERPL DL<=0.005 MIU/L-ACNC: 0.83 UIU/ML (ref 0.4–4)
WBC # BLD AUTO: 8.81 K/UL (ref 3.9–12.7)

## 2024-08-26 PROCEDURE — 83001 ASSAY OF GONADOTROPIN (FSH): CPT

## 2024-08-26 PROCEDURE — 84443 ASSAY THYROID STIM HORMONE: CPT

## 2024-08-26 PROCEDURE — 85025 COMPLETE CBC W/AUTO DIFF WBC: CPT

## 2024-08-26 PROCEDURE — 99385 PREV VISIT NEW AGE 18-39: CPT | Mod: S$PBB,,,

## 2024-08-26 PROCEDURE — 87491 CHLMYD TRACH DNA AMP PROBE: CPT

## 2024-08-26 PROCEDURE — 87591 N.GONORRHOEAE DNA AMP PROB: CPT

## 2024-08-26 PROCEDURE — 99213 OFFICE O/P EST LOW 20 MIN: CPT | Mod: PBBFAC

## 2024-08-26 PROCEDURE — 99999 PR PBB SHADOW E&M-EST. PATIENT-LVL III: CPT | Mod: PBBFAC,,,

## 2024-08-26 PROCEDURE — 36415 COLL VENOUS BLD VENIPUNCTURE: CPT

## 2024-08-26 RX ORDER — MUPIROCIN 20 MG/G
OINTMENT TOPICAL 2 TIMES DAILY
Qty: 30 G | Refills: 1 | Status: SHIPPED | OUTPATIENT
Start: 2024-08-26

## 2024-08-26 NOTE — PROGRESS NOTES
Subjective:       Patient ID: Columba Romano is a 39 y.o. female.    Chief Complaint:  No chief complaint on file.      History of Present Illness  HPI  Annual Exam-Premenopausal  Patient presents for annual exam. The patient has complaints today. The patient is sexually active, MM with . GYN screening history: last pap: was normal and patient does not recall when last pap was. The patient wears seatbelts: yes. The patient participates in regular exercise: no. Has the patient ever been transfused or tattooed?: no. The patient reports that there is not domestic violence in her life.    Menses are usually regular with periods lasting 28-30 days.    In July she had bleeding -  She then started bleeding again 2 weeks later  and bleeding stopped yesterday    She has has complaints of rash under both breast   GYN & OB History  No LMP recorded.   Date of Last Pap: 2024    OB History    Para Term  AB Living   8 1 1   7 1   SAB IAB Ectopic Multiple Live Births   7       1      # Outcome Date GA Lbr Juanpablo/2nd Weight Sex Type Anes PTL Lv   8 Term 14 40w0d  3.402 kg (7 lb 8 oz) F Vag-Spont   ARELI   7 SAB            6 SAB            5 SAB            4 SAB            3 SAB            2 SAB            1 SAB                Review of Systems  Review of Systems   Constitutional:  Negative for appetite change, fatigue and fever.   Gastrointestinal:  Negative for abdominal pain, bloating, constipation, diarrhea, nausea and vomiting.   Genitourinary:  Positive for menstrual problem. Negative for bladder incontinence, dysmenorrhea, dyspareunia, dysuria, flank pain, frequency, genital sores, menorrhagia, pelvic pain, urgency, vaginal bleeding, vaginal discharge, vaginal pain, postcoital bleeding, vaginal dryness and vaginal odor.   Integumentary:  Positive for rash.   All other systems reviewed and are negative.  Breast: Positive for breast self exam.          Objective:      Physical Exam:    Constitutional: She is oriented to person, place, and time. She appears well-developed and well-nourished.    HENT:   Head: Normocephalic and atraumatic.   Nose: Nose normal.    Eyes: Pupils are equal, round, and reactive to light. Conjunctivae and EOM are normal.     Cardiovascular:  Normal rate and regular rhythm.             Pulmonary/Chest: Effort normal. She has no decreased breath sounds. She has no rhonchi. Right breast exhibits no inverted nipple, no mass, no nipple discharge, no tenderness and no bleeding. Left breast exhibits no inverted nipple, no mass, no nipple discharge, no tenderness and no bleeding. Breasts are symmetrical.        Abdominal: Soft. There is no abdominal tenderness.     Genitourinary:    Inguinal canal, uterus, right adnexa and left adnexa normal.      Pelvic exam was performed with patient supine.   The external female genitalia was normal.   No external genitalia lesions identified,Genitalia hair distrobution normal .     Labial bartholins normal.Cervix is normal. Right adnexum displays no mass and no tenderness. Left adnexum displays no mass and no tenderness. There is bleeding (scant bleeding) in the vagina. No vaginal discharge or tenderness in the vagina. Vagina was moist.Cervix exhibits no motion tenderness, no discharge and no tenderness.    pap smear completedUerus contour normal  Uterus is not tender. Normal urethral meatus.          Musculoskeletal: Normal range of motion and moves all extremeties.       Neurological: She is alert and oriented to person, place, and time.    Skin: Skin is warm and dry. Rash (under bilateral breast) noted.    Psychiatric: She has a normal mood and affect. Her speech is normal and behavior is normal. Mood, judgment and thought content normal.             Assessment:        1. Encounter for annual routine gynecological examination    2. Cervical cancer screening    3. Irregular menstrual cycle    4. Screen for STD (sexually transmitted  disease)    5. Folliculitis               Plan:   Continue annual well woman exam.  Pap collected. Patient was counseled today on ASCCP screening guidelines (pap smear every 3 years in low risk patients) and recommendations for annual pelvic exams and clinical breast exams, yearly mammograms starting at age 40; and to see her PCP for other healthcare maintenance.   Labs ordered   Patient left room before urine collected and pelvic US scheduled    Diagnosis and orders this visit:  Encounter for annual routine gynecological examination    Cervical cancer screening  -     Liquid-Based Pap Smear, Screening  -     HPV High Risk Genotypes, PCR    Irregular menstrual cycle  -     CBC Auto Differential; Future; Expected date: 08/26/2024  -     Follicle Stimulating Hormone; Future; Expected date: 08/26/2024  -     TSH; Future; Expected date: 08/26/2024  -     US Pelvis Comp with Transvag NON-OB (xpd; Future; Expected date: 08/26/2024    Screen for STD (sexually transmitted disease)  -     C. trachomatis/N. gonorrhoeae by AMP DNA    Folliculitis  -     mupirocin (BACTROBAN) 2 % ointment; Apply topically 2 (two) times daily.  Dispense: 30 g; Refill: 1           Maryann Raymond, HUY

## 2024-08-27 LAB
C TRACH DNA SPEC QL NAA+PROBE: NOT DETECTED
N GONORRHOEA DNA SPEC QL NAA+PROBE: NOT DETECTED

## 2024-09-09 ENCOUNTER — TELEPHONE (OUTPATIENT)
Dept: OBSTETRICS AND GYNECOLOGY | Facility: CLINIC | Age: 39
End: 2024-09-09
Payer: OTHER GOVERNMENT

## 2024-09-09 NOTE — TELEPHONE ENCOUNTER
----- Message from Maryann Raymond NP sent at 9/6/2024  4:06 PM CDT -----  Please call and schedule repeat pap smear.     Thank you  Maryann

## 2024-09-09 NOTE — TELEPHONE ENCOUNTER
Contacted Columba Romano regarding results, verified 2 patient identifiers.    Patient's appointment has been scheduled. Columba Romano has been notified 09/09/2024 at 9:56 AM & verbalized understanding.

## 2024-10-04 ENCOUNTER — HOSPITAL ENCOUNTER (OUTPATIENT)
Dept: RADIOLOGY | Facility: HOSPITAL | Age: 39
Discharge: HOME | End: 2024-10-04
Payer: OTHER GOVERNMENT

## 2024-10-04 ENCOUNTER — OFFICE VISIT (OUTPATIENT)
Dept: OBSTETRICS AND GYNECOLOGY | Facility: CLINIC | Age: 39
End: 2024-10-04
Payer: OTHER GOVERNMENT

## 2024-10-04 VITALS
BODY MASS INDEX: 30.74 KG/M2 | WEIGHT: 184.5 LBS | SYSTOLIC BLOOD PRESSURE: 130 MMHG | HEIGHT: 65 IN | DIASTOLIC BLOOD PRESSURE: 62 MMHG

## 2024-10-04 DIAGNOSIS — N92.6 IRREGULAR MENSTRUAL CYCLE: ICD-10-CM

## 2024-10-04 DIAGNOSIS — R87.615 UNSATISFACTORY CERVICAL PAPANICOLAOU SMEAR: Primary | ICD-10-CM

## 2024-10-04 PROCEDURE — 99213 OFFICE O/P EST LOW 20 MIN: CPT | Mod: PBBFAC,25

## 2024-10-04 PROCEDURE — 76830 TRANSVAGINAL US NON-OB: CPT | Mod: 26,,, | Performed by: RADIOLOGY

## 2024-10-04 PROCEDURE — 76830 TRANSVAGINAL US NON-OB: CPT | Mod: TC

## 2024-10-04 PROCEDURE — 99999 PR PBB SHADOW E&M-EST. PATIENT-LVL III: CPT | Mod: PBBFAC,,,

## 2024-10-04 PROCEDURE — 76856 US EXAM PELVIC COMPLETE: CPT | Mod: TC

## 2024-10-04 PROCEDURE — 76856 US EXAM PELVIC COMPLETE: CPT | Mod: 26,,, | Performed by: RADIOLOGY

## 2024-10-04 NOTE — PROGRESS NOTES
Subjective:       Patient ID: Columba Romano is a 39 y.o. female.    Chief Complaint:  Abnormal Pap Smear (Repeat Pap)      History of Present Illness  HPI    Patient presents for repeat pap   Pap 24 unsatisfactory HPV negative       GYN & OB History  Patient's last menstrual period was 2024.   Date of Last Pap: 10/4/2024    OB History    Para Term  AB Living   8 1 1   7 1   SAB IAB Ectopic Multiple Live Births   7       1      # Outcome Date GA Lbr Juanpablo/2nd Weight Sex Type Anes PTL Lv   8 Term 14 40w0d  3.402 kg (7 lb 8 oz) F Vag-Spont   ARELI   7 SAB            6 SAB            5 SAB            4 SAB            3 SAB            2 SAB            1 SAB                Review of Systems  Review of Systems   Constitutional: Negative.    HENT: Negative.     Eyes: Negative.    Respiratory: Negative.     Cardiovascular: Negative.    Gastrointestinal: Negative.    Genitourinary: Negative.    Musculoskeletal: Negative.    Integumentary:  Negative.   Neurological: Negative.    Hematological: Negative.    Psychiatric/Behavioral: Negative.     All other systems reviewed and are negative.  Breast: negative.            Objective:      Physical Exam:   Constitutional: She is oriented to person, place, and time. She appears well-developed and well-nourished. No distress.    HENT:   Head: Normocephalic and atraumatic.    Eyes: Pupils are equal, round, and reactive to light. Conjunctivae and EOM are normal.     Cardiovascular:  Normal rate.             Pulmonary/Chest: Effort normal.        Abdominal: Soft. She exhibits no distension. There is no abdominal tenderness. There is no rebound and no guarding. Hernia confirmed negative in the right inguinal area and confirmed negative in the left inguinal area.     Genitourinary:    Inguinal canal, vagina, uterus, right adnexa and left adnexa normal.   Rectum:      No external hemorrhoid.      Pelvic exam was performed with patient supine.   The external  female genitalia was normal.   No external genitalia lesions identified,Genitalia hair distrobution normal .     Labial bartholins normal.There is no rash, tenderness, lesion or injury on the right labia. There is no rash, tenderness, lesion or injury on the left labia. Cervix is normal. Right adnexum displays no mass, no tenderness and no fullness. Left adnexum displays no mass, no tenderness and no fullness. No erythema, vaginal discharge, tenderness or bleeding in the vagina.    No foreign body in the vagina.      No signs of injury in the vagina.   Vagina was moist.Cervix exhibits no motion tenderness, no lesion, no friability, no tenderness and no polyp.    pap smear completedUerus contour normal  Uterus is not enlarged and not tender. Normal urethral meatus.Urethral Meatus exhibits: no urethral lesionUrethra findings: no urethral mass, no tenderness, no urethral scarring and no prolapsedBladder findings: no bladder distention and no bladder tenderness          Musculoskeletal: Normal range of motion and moves all extremeties.      Lymphadenopathy: No inguinal adenopathy noted on the right or left side.    Neurological: She is alert and oriented to person, place, and time.    Skin: Skin is warm and dry. No rash noted. She is not diaphoretic. No erythema. No pallor.    Psychiatric: She has a normal mood and affect. Her behavior is normal. Judgment and thought content normal.             Assessment:        1. Unsatisfactory cervical Papanicolaou smear               Plan:   Continue annual well woman exam.    Diagnosis and orders this visit:  Unsatisfactory cervical Papanicolaou smear  -     Liquid-Based Pap Smear, Screening         Maryann Raymond NP

## 2024-10-24 ENCOUNTER — PATIENT MESSAGE (OUTPATIENT)
Dept: RESEARCH | Facility: HOSPITAL | Age: 39
End: 2024-10-24
Payer: OTHER GOVERNMENT

## 2025-01-14 DIAGNOSIS — R04.2 COUGH WITH HEMOPTYSIS: ICD-10-CM

## 2025-01-14 RX ORDER — ALBUTEROL SULFATE 90 UG/1
1-2 INHALANT RESPIRATORY (INHALATION) EVERY 6 HOURS PRN
Qty: 54 G | Refills: 2 | Status: SHIPPED | OUTPATIENT
Start: 2025-01-14

## 2025-01-14 NOTE — TELEPHONE ENCOUNTER
No care due was identified.  [facility] Embedded Care Due Messages. Reference number: [id number].   1/14/2025 8:19:53 AM CST

## 2025-01-29 ENCOUNTER — OFFICE VISIT (OUTPATIENT)
Dept: INTERNAL MEDICINE | Facility: CLINIC | Age: 40
End: 2025-01-29
Payer: OTHER GOVERNMENT

## 2025-01-29 ENCOUNTER — HOSPITAL ENCOUNTER (OUTPATIENT)
Dept: RADIOLOGY | Facility: HOSPITAL | Age: 40
Discharge: HOME | End: 2025-01-29
Attending: FAMILY MEDICINE
Payer: OTHER GOVERNMENT

## 2025-01-29 VITALS
TEMPERATURE: 99 F | SYSTOLIC BLOOD PRESSURE: 134 MMHG | RESPIRATION RATE: 20 BRPM | HEART RATE: 83 BPM | BODY MASS INDEX: 30.52 KG/M2 | DIASTOLIC BLOOD PRESSURE: 94 MMHG | OXYGEN SATURATION: 99 % | WEIGHT: 183.44 LBS

## 2025-01-29 DIAGNOSIS — E66.811 OBESITY (BMI 30.0-34.9): ICD-10-CM

## 2025-01-29 DIAGNOSIS — M50.30 DDD (DEGENERATIVE DISC DISEASE), CERVICAL: ICD-10-CM

## 2025-01-29 DIAGNOSIS — R30.0 DYSURIA: ICD-10-CM

## 2025-01-29 DIAGNOSIS — M25.562 LEFT ANTERIOR KNEE PAIN: ICD-10-CM

## 2025-01-29 DIAGNOSIS — M50.30 DDD (DEGENERATIVE DISC DISEASE), CERVICAL: Primary | ICD-10-CM

## 2025-01-29 DIAGNOSIS — R03.0 ELEVATED BLOOD PRESSURE READING WITHOUT DIAGNOSIS OF HYPERTENSION: ICD-10-CM

## 2025-01-29 PROCEDURE — 72050 X-RAY EXAM NECK SPINE 4/5VWS: CPT | Mod: TC

## 2025-01-29 PROCEDURE — 73560 X-RAY EXAM OF KNEE 1 OR 2: CPT | Mod: TC,RT

## 2025-01-29 PROCEDURE — 99999 PR PBB SHADOW E&M-EST. PATIENT-LVL V: CPT | Mod: PBBFAC,,, | Performed by: FAMILY MEDICINE

## 2025-01-29 PROCEDURE — 99214 OFFICE O/P EST MOD 30 MIN: CPT | Mod: S$PBB,,, | Performed by: FAMILY MEDICINE

## 2025-01-29 PROCEDURE — 99215 OFFICE O/P EST HI 40 MIN: CPT | Mod: PBBFAC | Performed by: FAMILY MEDICINE

## 2025-01-29 PROCEDURE — 72050 X-RAY EXAM NECK SPINE 4/5VWS: CPT | Mod: 26,,, | Performed by: RADIOLOGY

## 2025-01-29 RX ORDER — PREDNISONE 20 MG/1
40 TABLET ORAL 2 TIMES DAILY
COMMUNITY
Start: 2025-01-27 | End: 2025-02-01

## 2025-01-29 RX ORDER — DOXYCYCLINE 100 MG/1
100 CAPSULE ORAL EVERY 12 HOURS
COMMUNITY
Start: 2025-01-26 | End: 2025-01-29

## 2025-01-29 NOTE — PROGRESS NOTES
Subjective:       Patient ID: Columba Romano is a 39 y.o. female presents with   Patient Active Problem List   Diagnosis    Gastroesophageal reflux disease    Post-cholecystectomy syndrome    Obesity (BMI 30.0-34.9)    Bicuspid aortic valve    Aortic valve regurgitation    DDD (degenerative disc disease), cervical    Fibrocystic disease of breast    Migraine with aura    Seasonal allergic rhinitis    Simple renal cyst        Chief Complaint: Pain, Numbness, and Urinary Tract Infection (Pt states she has been to [facility] twice within the last couple of weeks for UTI, was given abx which caused a severe rash, abx changed but says she is still experiencing pain when urinating, c/o numbness to (R) index finger)    History of Present Illness    Columba presents today for left knee pain after a fall. She fell when her leg gave out after tripping a week or two before [place]. She experiences discomfort with knee flexion and rotation, describing a sensation of something underneath the knee and feeling like she pulled something during the fall.     She has been sick since late November. Initially treated for a UTI with a 10-day course of amoxicillin and 3-day course of steroids on December 12th. She subsequently developed severe symptoms including throat pain, ear pressure, head pressure, sinus problems, and fever, leading to treatment with doxycycline and a 5-day course of steroids. She received Toradol and steroid injections for severe headaches.   She reports persistent UTI symptoms with discomfort but no burning sensation.   She reports difficulty turning her head, with pain radiating to her back and shoulder blade. She has experienced numbness in one finger for approximately three weeks, described as a sensation of pressure without tingling. She denies current pain or restriction of movement, attributing improvement to steroid treatment.       ROS:  General: +fever, -chills, -fatigue, -weight gain, -weight loss, -loss of  appetite  Eyes: -vision changes, -blurry vision, -eye pain, -eye discharge  ENT: -ear pain, -hearing loss, -tinnitus, -nasal congestion, -sore throat  Cardiovascular: -chest pain, -palpitations, -lower extremity edema  Respiratory: -cough, -shortness of breath, -wheezing, -sputum production  Endocrine: -polyuria, -polydipsia, -heat intolerance, -cold intolerance  Gastrointestinal: -abdominal pain, -heartburn, -nausea, -vomiting, -diarrhea, -constipation, -blood in stool  Genitourinary: -dysuria, -urgency, -frequency, -hematuria, -nocturia, -incontinence  Heme & Lymphatic: -easy or excessive bleeding, -easy bruising, -swollen lymph nodes  Musculoskeletal: +muscle pain, -back pain, +joint pain, -joint swelling  Skin: -rash, -lesion, -itching, -skin texture changes, -skin color changes  Neurological: +headache, -dizziness, +numbness, -tingling, -seizure activity, -speech difficulty, -memory loss, -confusion  Psychiatric: -anxiety, -depression, -sleep difficulty                BP (!) 134/94 (BP Location: Left arm, Patient Position: Sitting)   Pulse 83   Temp 98.6 °F (37 °C) (Temporal)   Resp 20   Wt 83.2 kg (183 lb 6.8 oz)   LMP 01/08/2025 (Exact Date)   SpO2 99%   BMI 30.52 kg/m²   Objective:      Physical Exam  Constitutional:       Appearance: She is well-developed.   HENT:      Head: Normocephalic and atraumatic.   Cardiovascular:      Rate and Rhythm: Normal rate and regular rhythm.      Heart sounds: Normal heart sounds. No murmur heard.  Pulmonary:      Effort: Pulmonary effort is normal.      Breath sounds: Normal breath sounds. No wheezing.   Abdominal:      General: Bowel sounds are normal.      Palpations: Abdomen is soft.      Tenderness: There is no abdominal tenderness.   Musculoskeletal:         General: Tenderness present.      Comments: Positive for left knee tenderness anteriorly and paraspinal cervical muscle tenderness on the right side   Skin:     General: Skin is warm and dry.      Findings:  No rash.   Neurological:      Mental Status: She is alert and oriented to person, place, and time.   Psychiatric:         Mood and Affect: Mood normal.           Assessment/Plan:   1. DDD (degenerative disc disease), cervical  Overview:  Note: by MRI 6/17    Orders:  -     X-Ray Cervical Spine Complete 5 view; Future; Expected date: 01/29/2025  -     Ambulatory Referral/Consult to Physical/Occupational Therapy; Future; Expected date: 02/05/2025  Will get x-ray of the cervical spine for further evaluation and patient is interested in dry needling    2. Left anterior knee pain  -     X-Ray Knee 3 View Left; Future; Expected date: 01/29/2025  -     Ambulatory Referral/Consult to Physical/Occupational Therapy; Future; Expected date: 02/05/2025  Will get x-ray of the left knee for further evaluation and will refer to physical therapy  Patient was advised to take extra-strength Tylenol as needed for pain  Currently finishing up steroids    3. Dysuria  -     Urinalysis, Reflex to Urine Culture Urine, Clean Catch; Future; Expected date: 01/29/2025  Patient currently finishing up antibiotics and has 3 more days of doxycycline   Patient was advised to finish antibiotics as prescribed and will plan to repeat urinalysis on Saturday    4. Obesity (BMI 30.0-34.9)  Lifestyle modifications recommended to lose weight with BMI 30    Patient reports that she has noted elevated blood pressure when taking steroids in the past as well, encouraged to monitor blood pressure trends  Clinically asymptomatic           This note was generated with the assistance of ambient listening technology. Verbal consent was obtained by the patient and accompanying visitor(s) for the recording of patient appointment to facilitate this note. I attest to having reviewed and edited the generated note for accuracy, though some syntax or spelling errors may persist. Please contact the author of this note for any clarification.

## 2025-01-31 ENCOUNTER — PATIENT MESSAGE (OUTPATIENT)
Dept: INTERNAL MEDICINE | Facility: CLINIC | Age: 40
End: 2025-01-31
Payer: OTHER GOVERNMENT

## 2025-02-03 ENCOUNTER — LAB VISIT (OUTPATIENT)
Dept: LAB | Facility: HOSPITAL | Age: 40
End: 2025-02-03
Payer: OTHER GOVERNMENT

## 2025-02-03 DIAGNOSIS — R30.0 DYSURIA: ICD-10-CM

## 2025-02-03 LAB
BILIRUB UR QL STRIP: NEGATIVE
CLARITY UR: CLEAR
COLOR UR: YELLOW
GLUCOSE UR QL STRIP: NEGATIVE
HGB UR QL STRIP: ABNORMAL
KETONES UR QL STRIP: NEGATIVE
LEUKOCYTE ESTERASE UR QL STRIP: NEGATIVE
NITRITE UR QL STRIP: NEGATIVE
PH UR STRIP: 7 [PH] (ref 5–8)
PROT UR QL STRIP: NEGATIVE
SP GR UR STRIP: 1.02 (ref 1–1.03)
URN SPEC COLLECT METH UR: ABNORMAL

## 2025-02-03 PROCEDURE — 81003 URINALYSIS AUTO W/O SCOPE: CPT | Performed by: FAMILY MEDICINE

## 2025-02-05 ENCOUNTER — PATIENT MESSAGE (OUTPATIENT)
Dept: PULMONOLOGY | Facility: CLINIC | Age: 40
End: 2025-02-05
Payer: OTHER GOVERNMENT

## 2025-02-05 ENCOUNTER — HOSPITAL ENCOUNTER (EMERGENCY)
Facility: HOSPITAL | Age: 40
Discharge: HOME | End: 2025-02-06
Attending: EMERGENCY MEDICINE
Payer: OTHER GOVERNMENT

## 2025-02-05 DIAGNOSIS — M50.30 DDD (DEGENERATIVE DISC DISEASE), CERVICAL: Primary | ICD-10-CM

## 2025-02-05 DIAGNOSIS — M54.2 NECK PAIN: ICD-10-CM

## 2025-02-05 LAB
ALBUMIN SERPL BCP-MCNC: 4 G/DL (ref 3.5–5.2)
ALP SERPL-CCNC: 66 U/L (ref 40–150)
ALT SERPL W/O P-5'-P-CCNC: 32 U/L (ref 10–44)
ANION GAP SERPL CALC-SCNC: 10 MMOL/L (ref 8–16)
AST SERPL-CCNC: 17 U/L (ref 10–40)
B-HCG UR QL: NEGATIVE
BACTERIA #/AREA URNS HPF: ABNORMAL /HPF
BASOPHILS # BLD AUTO: 0.08 K/UL (ref 0–0.2)
BASOPHILS NFR BLD: 0.7 % (ref 0–1.9)
BILIRUB SERPL-MCNC: 0.9 MG/DL (ref 0.1–1)
BILIRUB UR QL STRIP: NEGATIVE
BUN SERPL-MCNC: 11 MG/DL (ref 6–20)
CALCIUM SERPL-MCNC: 9.2 MG/DL (ref 8.7–10.5)
CHLORIDE SERPL-SCNC: 108 MMOL/L (ref 95–110)
CLARITY UR: ABNORMAL
CO2 SERPL-SCNC: 20 MMOL/L (ref 23–29)
COLOR UR: YELLOW
CREAT SERPL-MCNC: 0.7 MG/DL (ref 0.5–1.4)
DIFFERENTIAL METHOD BLD: ABNORMAL
EOSINOPHIL # BLD AUTO: 0.2 K/UL (ref 0–0.5)
EOSINOPHIL NFR BLD: 1.6 % (ref 0–8)
ERYTHROCYTE [DISTWIDTH] IN BLOOD BY AUTOMATED COUNT: 13.1 % (ref 11.5–14.5)
EST. GFR  (NO RACE VARIABLE): >60 ML/MIN/1.73 M^2
GLUCOSE SERPL-MCNC: 105 MG/DL (ref 70–110)
GLUCOSE UR QL STRIP: NEGATIVE
HCT VFR BLD AUTO: 41.1 % (ref 37–48.5)
HCV AB SERPL QL IA: NEGATIVE
HEP C VIRUS HOLD SPECIMEN: NORMAL
HGB BLD-MCNC: 13.4 G/DL (ref 12–16)
HGB UR QL STRIP: ABNORMAL
HIV 1+2 AB+HIV1 P24 AG SERPL QL IA: NEGATIVE
IMM GRANULOCYTES # BLD AUTO: 0.07 K/UL (ref 0–0.04)
IMM GRANULOCYTES NFR BLD AUTO: 0.6 % (ref 0–0.5)
KETONES UR QL STRIP: NEGATIVE
LEUKOCYTE ESTERASE UR QL STRIP: NEGATIVE
LYMPHOCYTES # BLD AUTO: 2.5 K/UL (ref 1–4.8)
LYMPHOCYTES NFR BLD: 22.3 % (ref 18–48)
MCH RBC QN AUTO: 29.3 PG (ref 27–31)
MCHC RBC AUTO-ENTMCNC: 32.6 G/DL (ref 32–36)
MCV RBC AUTO: 90 FL (ref 82–98)
MICROSCOPIC COMMENT: ABNORMAL
MONOCYTES # BLD AUTO: 0.7 K/UL (ref 0.3–1)
MONOCYTES NFR BLD: 6.3 % (ref 4–15)
NEUTROPHILS # BLD AUTO: 7.6 K/UL (ref 1.8–7.7)
NEUTROPHILS NFR BLD: 68.5 % (ref 38–73)
NITRITE UR QL STRIP: NEGATIVE
NRBC BLD-RTO: 0 /100 WBC
PH UR STRIP: 8 [PH] (ref 5–8)
PLATELET # BLD AUTO: 287 K/UL (ref 150–450)
PMV BLD AUTO: 10.3 FL (ref 9.2–12.9)
POTASSIUM SERPL-SCNC: 4 MMOL/L (ref 3.5–5.1)
PROT SERPL-MCNC: 6.8 G/DL (ref 6–8.4)
PROT UR QL STRIP: NEGATIVE
RBC # BLD AUTO: 4.58 M/UL (ref 4–5.4)
RBC #/AREA URNS HPF: 5 /HPF (ref 0–4)
SODIUM SERPL-SCNC: 138 MMOL/L (ref 136–145)
SP GR UR STRIP: 1.02 (ref 1–1.03)
SQUAMOUS #/AREA URNS HPF: 7 /HPF
URN SPEC COLLECT METH UR: ABNORMAL
UROBILINOGEN UR STRIP-ACNC: NEGATIVE EU/DL
WBC # BLD AUTO: 11.04 K/UL (ref 3.9–12.7)
WBC #/AREA URNS HPF: 2 /HPF (ref 0–5)

## 2025-02-05 PROCEDURE — 86803 HEPATITIS C AB TEST: CPT | Performed by: EMERGENCY MEDICINE

## 2025-02-05 PROCEDURE — 81000 URINALYSIS NONAUTO W/SCOPE: CPT | Performed by: EMERGENCY MEDICINE

## 2025-02-05 PROCEDURE — 99285 EMERGENCY DEPT VISIT HI MDM: CPT | Mod: 25

## 2025-02-05 PROCEDURE — 81025 URINE PREGNANCY TEST: CPT | Performed by: EMERGENCY MEDICINE

## 2025-02-05 PROCEDURE — 87389 HIV-1 AG W/HIV-1&-2 AB AG IA: CPT | Performed by: EMERGENCY MEDICINE

## 2025-02-05 PROCEDURE — 85025 COMPLETE CBC W/AUTO DIFF WBC: CPT | Performed by: EMERGENCY MEDICINE

## 2025-02-05 PROCEDURE — 80053 COMPREHEN METABOLIC PANEL: CPT | Performed by: EMERGENCY MEDICINE

## 2025-02-06 VITALS
TEMPERATURE: 98 F | RESPIRATION RATE: 20 BRPM | HEIGHT: 65 IN | SYSTOLIC BLOOD PRESSURE: 128 MMHG | BODY MASS INDEX: 29.66 KG/M2 | HEART RATE: 86 BPM | DIASTOLIC BLOOD PRESSURE: 76 MMHG | OXYGEN SATURATION: 99 % | WEIGHT: 178 LBS

## 2025-02-06 PROCEDURE — 25000003 PHARM REV CODE 250: Performed by: EMERGENCY MEDICINE

## 2025-02-06 PROCEDURE — 25500020 PHARM REV CODE 255: Performed by: EMERGENCY MEDICINE

## 2025-02-06 PROCEDURE — 96374 THER/PROPH/DIAG INJ IV PUSH: CPT

## 2025-02-06 PROCEDURE — 63600175 PHARM REV CODE 636 W HCPCS: Mod: JZ,TB | Performed by: EMERGENCY MEDICINE

## 2025-02-06 RX ORDER — HYDROCODONE BITARTRATE AND ACETAMINOPHEN 5; 325 MG/1; MG/1
1 TABLET ORAL EVERY 6 HOURS PRN
Qty: 12 TABLET | Refills: 0 | Status: SHIPPED | OUTPATIENT
Start: 2025-02-06 | End: 2025-02-13

## 2025-02-06 RX ORDER — NAPROXEN 500 MG/1
500 TABLET ORAL 2 TIMES DAILY WITH MEALS
Qty: 14 TABLET | Refills: 0 | Status: SHIPPED | OUTPATIENT
Start: 2025-02-06

## 2025-02-06 RX ORDER — HYDROCODONE BITARTRATE AND ACETAMINOPHEN 5; 325 MG/1; MG/1
1 TABLET ORAL
Status: COMPLETED | OUTPATIENT
Start: 2025-02-06 | End: 2025-02-06

## 2025-02-06 RX ORDER — KETOROLAC TROMETHAMINE 30 MG/ML
15 INJECTION, SOLUTION INTRAMUSCULAR; INTRAVENOUS
Status: COMPLETED | OUTPATIENT
Start: 2025-02-06 | End: 2025-02-06

## 2025-02-06 RX ORDER — ORPHENADRINE CITRATE 100 MG/1
100 TABLET, EXTENDED RELEASE ORAL 2 TIMES DAILY
Qty: 10 TABLET | Refills: 0 | Status: SHIPPED | OUTPATIENT
Start: 2025-02-06

## 2025-02-06 RX ADMIN — KETOROLAC TROMETHAMINE 15 MG: 30 INJECTION, SOLUTION INTRAMUSCULAR at 01:02

## 2025-02-06 RX ADMIN — IOHEXOL 100 ML: 350 INJECTION, SOLUTION INTRAVENOUS at 12:02

## 2025-02-06 RX ADMIN — HYDROCODONE BITARTRATE AND ACETAMINOPHEN 1 TABLET: 5; 325 TABLET ORAL at 01:02

## 2025-02-06 NOTE — ED NOTES
Spoke to CT about delay time for CTA, she reported that she was unsure if pt had an IV. IV was not charted from EMS arrival. CT made aware pt has patent IV access, CT reports that a tech is needed for transport due to increased amount of Cts.

## 2025-02-06 NOTE — ED PROVIDER NOTES
"SCRIBE #1 NOTE: I, Roopa Turner, am scribing for, and in the presence of, Luan Velez DO. I have scribed the entire note.       History     Chief Complaint   Patient presents with    Neck Pain     Per EMS pt presents with L sided neck pressure worsening today. Pt initially described it as a "popping" to neck. Denies trauma. Pt reports of dizziness upon standing.      Review of patient's allergies indicates:   Allergen Reactions    Diphenhydramine (bulk) Other (See Comments)     Restless legs    Meperidine Itching, Anxiety and Hallucinations         History of Present Illness     HPI    2/5/2025, 9:43 PM  History obtained from the patient      History of Present Illness: Columba Romnao is a 39 y.o. female patient with a PMHx of IBS and bicuspid aortic valve who presents to the Emergency Department for evaluation of left sided neck pain which began earlier today. Patient explains that she felt a pop in her neck and after the pop it became very tender. She explains that it feels like the pressure of a fist pushing into her neck. Symptoms are constant and moderate in severity. No mitigating or exacerbating factors reported. Associated sxs include neck stiffness and dizziness when standing. No prior Tx included. No further complaints or concerns at this time.       Arrival mode: Ambulance Service    PCP: Joan Forrester MD        Past Medical History:  Past Medical History:   Diagnosis Date    Bicuspid aortic valve     open and flows fine, has checked yearly with ultrasound    IBS (irritable bowel syndrome)        Past Surgical History:  Past Surgical History:   Procedure Laterality Date    CHOLECYSTECTOMY           Family History:  Family History   Problem Relation Name Age of Onset    Breast cancer Paternal Aunt      Hypothyroidism Mother      Heart disease Mother      Skin cancer Mother      Heart disease Father      Emphysema Father      Stroke Father      Colon cancer Neg Hx      Ovarian cancer Neg Hx   " "   Uterine cancer Neg Hx         Social History:  Social History     Tobacco Use    Smoking status: Some Days     Types: Cigarettes    Smokeless tobacco: Current   Substance and Sexual Activity    Alcohol use: Not Currently     Comment: socially    Drug use: Never    Sexual activity: Yes     Partners: Male     Birth control/protection: None        Review of Systems     Review of Systems   Musculoskeletal:  Positive for neck pain (pressure) and neck stiffness.   Neurological:  Positive for dizziness.      Physical Exam     Initial Vitals [02/05/25 1933]   BP Pulse Resp Temp SpO2   138/87 90 20 98.3 °F (36.8 °C) 99 %      MAP       --          Physical Exam  Vitals reviewed.   Constitutional:       Appearance: Normal appearance.   Neck:      Comments: No midline tenderness to palpation, step-offs, crepitus  Pulmonary:      Effort: Pulmonary effort is normal.      Breath sounds: No wheezing.   Abdominal:      Palpations: Abdomen is soft.      Tenderness: There is no abdominal tenderness.   Musculoskeletal:         General: Normal range of motion.   Skin:     General: Skin is warm and dry.      Findings: No rash.   Neurological:      General: No focal deficit present.      Mental Status: She is oriented to person, place, and time.            ED Course   Procedures  ED Vital Signs:  Vitals:    02/05/25 1933 02/05/25 2128 02/06/25 0129 02/06/25 0205   BP: 138/87 111/70  128/76   Pulse: 90 80  86   Resp: 20 18 18 20   Temp: 98.3 °F (36.8 °C)      TempSrc: Oral      SpO2: 99% 99%  99%   Weight: 80.7 kg (178 lb)      Height: 5' 5" (1.651 m)          Abnormal Lab Results:  Labs Reviewed   CBC W/ AUTO DIFFERENTIAL - Abnormal       Result Value    WBC 11.04      RBC 4.58      Hemoglobin 13.4      Hematocrit 41.1      MCV 90      MCH 29.3      MCHC 32.6      RDW 13.1      Platelets 287      MPV 10.3      Immature Granulocytes 0.6 (*)     Gran # (ANC) 7.6      Immature Grans (Abs) 0.07 (*)     Lymph # 2.5      Mono # 0.7      Eos " # 0.2      Baso # 0.08      nRBC 0      Gran % 68.5      Lymph % 22.3      Mono % 6.3      Eosinophil % 1.6      Basophil % 0.7      Differential Method Automated     COMPREHENSIVE METABOLIC PANEL - Abnormal    Sodium 138      Potassium 4.0      Chloride 108      CO2 20 (*)     Glucose 105      BUN 11      Creatinine 0.7      Calcium 9.2      Total Protein 6.8      Albumin 4.0      Total Bilirubin 0.9      Alkaline Phosphatase 66      AST 17      ALT 32      eGFR >60      Anion Gap 10     URINALYSIS, REFLEX TO URINE CULTURE - Abnormal    Specimen UA Urine, Clean Catch      Color, UA Yellow      Appearance, UA Hazy (*)     pH, UA 8.0      Specific Gravity, UA 1.020      Protein, UA Negative      Glucose, UA Negative      Ketones, UA Negative      Bilirubin (UA) Negative      Occult Blood UA 1+ (*)     Nitrite, UA Negative      Urobilinogen, UA Negative      Leukocytes, UA Negative      Narrative:     Specimen Source->Urine   URINALYSIS MICROSCOPIC - Abnormal    RBC, UA 5 (*)     WBC, UA 2      Bacteria Rare      Squam Epithel, UA 7      Microscopic Comment SEE COMMENT      Narrative:     Specimen Source->Urine   HEPATITIS C ANTIBODY    Hepatitis C Ab Negative      Narrative:     Release to patient->Immediate   HEP C VIRUS HOLD SPECIMEN    HEP C Virus Hold Specimen Hold for HCV sendout      Narrative:     Release to patient->Immediate   HIV 1 / 2 ANTIBODY    HIV 1/2 Ag/Ab Negative      Narrative:     Release to patient->Immediate   PREGNANCY TEST, URINE RAPID    Preg Test, Ur Negative      Narrative:     Specimen Source->Urine        All Lab Results:  Results for orders placed or performed during the hospital encounter of 02/05/25   Pregnancy, urine rapid    Collection Time: 02/05/25  9:49 PM   Result Value Ref Range    Preg Test, Ur Negative    Urinalysis, Reflex to Urine Culture Urine, Clean Catch    Collection Time: 02/05/25  9:49 PM    Specimen: Urine, Clean Catch   Result Value Ref Range    Specimen UA Urine,  Clean Catch     Color, UA Yellow Yellow, Straw, Nelsy    Appearance, UA Hazy (A) Clear    pH, UA 8.0 5.0 - 8.0    Specific Gravity, UA 1.020 1.005 - 1.030    Protein, UA Negative Negative    Glucose, UA Negative Negative    Ketones, UA Negative Negative    Bilirubin (UA) Negative Negative    Occult Blood UA 1+ (A) Negative    Nitrite, UA Negative Negative    Urobilinogen, UA Negative <2.0 EU/dL    Leukocytes, UA Negative Negative   Urinalysis Microscopic    Collection Time: 02/05/25  9:49 PM   Result Value Ref Range    RBC, UA 5 (H) 0 - 4 /hpf    WBC, UA 2 0 - 5 /hpf    Bacteria Rare None-Occ /hpf    Squam Epithel, UA 7 /hpf    Microscopic Comment SEE COMMENT    Hepatitis C Antibody    Collection Time: 02/05/25 10:02 PM   Result Value Ref Range    Hepatitis C Ab Negative Negative   HCV Virus Hold Specimen    Collection Time: 02/05/25 10:02 PM   Result Value Ref Range    HEP C Virus Hold Specimen Hold for HCV sendout    HIV 1/2 Ag/Ab (4th Gen)    Collection Time: 02/05/25 10:02 PM   Result Value Ref Range    HIV 1/2 Ag/Ab Negative Negative   CBC auto differential    Collection Time: 02/05/25 10:02 PM   Result Value Ref Range    WBC 11.04 3.90 - 12.70 K/uL    RBC 4.58 4.00 - 5.40 M/uL    Hemoglobin 13.4 12.0 - 16.0 g/dL    Hematocrit 41.1 37.0 - 48.5 %    MCV 90 82 - 98 fL    MCH 29.3 27.0 - 31.0 pg    MCHC 32.6 32.0 - 36.0 g/dL    RDW 13.1 11.5 - 14.5 %    Platelets 287 150 - 450 K/uL    MPV 10.3 9.2 - 12.9 fL    Immature Granulocytes 0.6 (H) 0.0 - 0.5 %    Gran # (ANC) 7.6 1.8 - 7.7 K/uL    Immature Grans (Abs) 0.07 (H) 0.00 - 0.04 K/uL    Lymph # 2.5 1.0 - 4.8 K/uL    Mono # 0.7 0.3 - 1.0 K/uL    Eos # 0.2 0.0 - 0.5 K/uL    Baso # 0.08 0.00 - 0.20 K/uL    nRBC 0 0 /100 WBC    Gran % 68.5 38.0 - 73.0 %    Lymph % 22.3 18.0 - 48.0 %    Mono % 6.3 4.0 - 15.0 %    Eosinophil % 1.6 0.0 - 8.0 %    Basophil % 0.7 0.0 - 1.9 %    Differential Method Automated    Comprehensive metabolic panel    Collection Time: 02/05/25  10:02 PM   Result Value Ref Range    Sodium 138 136 - 145 mmol/L    Potassium 4.0 3.5 - 5.1 mmol/L    Chloride 108 95 - 110 mmol/L    CO2 20 (L) 23 - 29 mmol/L    Glucose 105 70 - 110 mg/dL    BUN 11 6 - 20 mg/dL    Creatinine 0.7 0.5 - 1.4 mg/dL    Calcium 9.2 8.7 - 10.5 mg/dL    Total Protein 6.8 6.0 - 8.4 g/dL    Albumin 4.0 3.5 - 5.2 g/dL    Total Bilirubin 0.9 0.1 - 1.0 mg/dL    Alkaline Phosphatase 66 40 - 150 U/L    AST 17 10 - 40 U/L    ALT 32 10 - 44 U/L    eGFR >60 >60 mL/min/1.73 m^2    Anion Gap 10 8 - 16 mmol/L       Imaging Results:  Imaging Results              CTA Neck (Final result)  Result time 02/06/25 07:48:11      Final result by Jacky Mcdowell MD (02/06/25 07:48:11)                   Impression:      No acute abnormality.  Mild ectasia of the ascending aorta at 3.6 cm.    All CT scans at this facility use dose modulation, iterative reconstruction and/or weight based dosing when appropriate to reduce radiation dose to as low as reasonably achievable.      Electronically signed by: Jacky Mcdowell MD  Date:    02/06/2025  Time:    07:48               Narrative:    EXAMINATION:  CTA NECK    CLINICAL HISTORY:  Carotid artery dissection;Vertebral artery dissection suspected;    TECHNIQUE:  Standard contrast enhanced CTA of neck with 100 mL Omnipaque 350 contrast with 3D MIP reformations.    COMPARISON:  None    FINDINGS:  CTA neck: The ascending aorta is 3.6 cm.  The descending aorta is 1.8 cm.  The aortic arch demonstrates a normal 3 vessel appearance.    The common carotid arteries appear normal.    The right and left CCA bifurcations are normal.    The internal carotid arteries are normal.    The vertebral arteries are patent.    No additional soft tissue abnormality.                                  1:16 AM: Per [facility] radiology, pt's CT angiography neck with intravenous contrast results: Negative CTA neck.  Radiologist: Triston Jo MD     No EKG ordered.           The Emergency Provider  reviewed the vital signs and test results, which are outlined above.     ED Discussion     1:44 AM: Reassessed pt at this time. Discussed with pt all pertinent ED information and results. Discussed pt dx and plan of tx. Gave pt all f/u and return to the ED instructions. All questions and concerns were addressed at this time. Pt expresses understanding of information and instructions, and is comfortable with plan to discharge. Pt is stable for discharge.    I discussed with patient and/or family/caretaker that evaluation in the ED does not suggest any emergent or life threatening medical conditions requiring immediate intervention beyond what was provided in the ED, and I believe patient is safe for discharge.  Regardless, an unremarkable evaluation in the ED does not preclude the development or presence of a serious of life threatening condition. As such, patient was instructed to return immediately for any worsening or change in current symptoms.        Medical Decision Making  Ambulatory referral to neurosurgery was placed.  Instructed patient to return immediately for any new worsening symptoms and she verbalized understanding.    Amount and/or Complexity of Data Reviewed  Labs: ordered. Decision-making details documented in ED Course.     Details: CBC and CMP are nonspecific, UA shows no UTI, hepatitis-C and HIV are both negative  Radiology: ordered. Decision-making details documented in ED Course.     Details: CTA of the neck shows no acute abnormality    Risk  Prescription drug management.  Risk Details: Differential diagnosis includes but is not limited to:  Fracture, dislocation, carotid artery dissection, musculoskeletal pain                ED Medication(s):  Medications   iohexoL (OMNIPAQUE 350) injection 100 mL (100 mLs Intravenous Given 2/6/25 0016)   ketorolac injection 15 mg (15 mg Intravenous Given 2/6/25 0129)   HYDROcodone-acetaminophen 5-325 mg per tablet 1 tablet (1 tablet Oral Given 2/6/25 0129)        Discharge Medication List as of 2/6/2025  1:44 AM        START taking these medications    Details   HYDROcodone-acetaminophen (NORCO) 5-325 mg per tablet Take 1 tablet by mouth every 6 (six) hours as needed for Pain., Starting u 2/6/2025, Print      naproxen (NAPROSYN) 500 MG tablet Take 1 tablet (500 mg total) by mouth 2 (two) times daily with meals., Starting Thu 2/6/2025, Print      orphenadrine (NORFLEX) 100 mg tablet Take 1 tablet (100 mg total) by mouth 2 (two) times daily., Starting Thu 2/6/2025, Print              Follow-up Information       Schedule an appointment as soon as possible for a visit  with Joan Forrester MD.    Specialty: Family Medicine  Contact information:  [address]  [place] [address] [phone]               Schedule an appointment as soon as possible for a visit  with Twan Rondon MD.    Specialty: Neurosurgery  Contact information:  [address] [facility] [address] [phone]                                 Scribe Attestation:   Scribe #1: I performed the above scribed service and the documentation accurately describes the services I performed. I attest to the accuracy of the note.     Attending:   Physician Attestation Statement for Scribe #1: I, Luan Velez DO, personally performed the services described in this documentation, as scribed by Roopa Turner, in my presence, and it is both accurate and complete.           Clinical Impression       ICD-10-CM ICD-9-CM   1. DDD (degenerative disc disease), cervical  M50.30 722.4   2. Neck pain  M54.2 723.1       Disposition:   Disposition: Discharged  Condition: Stable       Luan Velez DO  02/11/25 1602

## 2025-02-07 ENCOUNTER — TELEPHONE (OUTPATIENT)
Dept: NEUROSURGERY | Facility: CLINIC | Age: 40
End: 2025-02-07
Payer: OTHER GOVERNMENT

## 2025-02-07 ENCOUNTER — CLINICAL SUPPORT (OUTPATIENT)
Dept: REHABILITATION | Facility: HOSPITAL | Age: 40
End: 2025-02-07
Payer: OTHER GOVERNMENT

## 2025-02-07 DIAGNOSIS — M54.2 NECK PAIN: Primary | ICD-10-CM

## 2025-02-07 DIAGNOSIS — M50.30 DDD (DEGENERATIVE DISC DISEASE), CERVICAL: ICD-10-CM

## 2025-02-07 DIAGNOSIS — M25.562 LEFT ANTERIOR KNEE PAIN: ICD-10-CM

## 2025-02-07 DIAGNOSIS — R20.2 PARESTHESIA OF SKIN: ICD-10-CM

## 2025-02-07 PROCEDURE — 97161 PT EVAL LOW COMPLEX 20 MIN: CPT | Mod: PN

## 2025-02-07 PROCEDURE — 97110 THERAPEUTIC EXERCISES: CPT | Mod: PN

## 2025-02-07 NOTE — TELEPHONE ENCOUNTER
I spoke with the pt, who has been scheduled for  02/ 27 at  11 AM. The pt confirmed appt date and time. The pt verbalized understanding.

## 2025-02-07 NOTE — PROGRESS NOTES
Outpatient Rehab    Physical Therapy Evaluation    Patient Name: Columba Romano  MRN: [id number]  YOB: 1985  Today's Date: 2/7/2025    Therapy Diagnosis:   Encounter Diagnoses   Name Primary?    DDD (degenerative disc disease), cervical     Left anterior knee pain     Neck pain Yes    Paresthesia of skin      Physician: Joan Forrester MD    Physician Orders: Eval and Treat  Medical Diagnosis:   M50.30 (ICD-10-CM) - DDD (degenerative disc disease), cervical   M25.562 (ICD-10-CM) - Left anterior knee pain       Visit # / Visits Authorized:  1 / 1   Date of Evaluation:  2/7/2025   Insurance Authorization Period: 1/29/25 to 12/20/25  Plan of Care Certification:  2/7/2025 to 3/21/25      Time In: 12:30 PM  Time Out: 1:25 PM  Total Time: 55 minutes   Total Billable Time: 55 minutes         Subjective   History of Present Illness  Columba is a 39 y.o. female who reports to physical therapy with a chief concern of Neck and R arm pain. According to the patient's chart, Columba has a past medical history of Bicuspid aortic valve and IBS (irritable bowel syndrome). Columba has a past surgical history that includes Cholecystectomy.    The patient reports a medical diagnosis of M50.30 (ICD-10-CM) - DDD (degenerative disc disease), cervical  M25.562 (ICD-10-CM) - Left anterior knee pain.    Diagnostic tests related to this condition: X-ray.   X-Ray Details: Mild C6-7 degenerative disc disease.    Dominant Hand: Right  History of Present Condition/Illness: Pt reports long history of neck pain with no known cause. Pt reports pain that radiates from the shoulder blade that extends down into the upper arm. Pt reports R index finger is numb as well, past MCP. Pt reports symptoms are typically triggered with neck movements though she doesn't have pain reproduction today after finishing steroid dose. Pt reports some dizziness with neck extension and head turning for prolonged periods.     Activities of Daily Living  Social history  was obtained from Patient.               Previously independent with activities of daily living? Yes     Currently independent with activities of daily living? Yes              Pain     Patient reports a current pain level of 2/10. Pain at best is reported as 0/10. Pain at worst is reported as 8/10.   Clinical Progression (since onset): Stable         Treatment History  Treatments  Previously Received Treatments: No    Living Arrangements  Living Situation  Housing: Home independently  Living Arrangements: Spouse/significant other, Children        Employment  Patient does not report that: Does the patient's condition impact their ability to work?      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    Columba Romano  has a past surgical history that includes Cholecystectomy.    Columba has a current medication list which includes the following prescription(s): albuterol, fluticasone propionate, hydrocodone-acetaminophen, hydrocortisone, ketoconazole, loratadine, multivitamin, naproxen, omeprazole, and orphenadrine.    Review of patient's allergies indicates:   Allergen Reactions    Diphenhydramine (bulk) Other (See Comments)     Restless legs    Meperidine Itching, Anxiety and Hallucinations        Objective      Right Dermatomes  Right Cervical Dermatome Light Touch  Intact: C2, C3, C4, and C5  Diminished: C6, C7, and C8       Left Dermatomes  Left Cervical Dermatome Light Touch  Intact: C2, C3, C4, C5, C6, C7, and C8           Right Upper Extremity Reflexes       Biceps, C5-C6: Normal (2+)         Triceps, C7: Trace (1+)    Lewis's Sign: No    Left Upper Extremity Reflexes       Biceps, C5-C6: Normal (2+)         Triceps, C7: Trace (1+)    Lewis's Sign: No    Upper Extremity Reflex Details  Negative inverted supinator B.         Spinal Mobility  Cervical Mobility Details:  "Unable to accurately assess due to muscle guarding.         Subcranial Range of Motion   Active Restricted? Passive Restricted? Pain   Flexion         Protraction         Retraction           Cervical Range of Motion   Active (deg) Passive (deg) Pain   Flexion 60   Yes   Extension 45 (Discomfort and mild dizziness reported)       Right Lateral Flexion         Right Rotation 45 (No pain at this time but does report history of pain with this movement)       Left Lateral Flexion         Left Rotation 45              Shoulder Range of Motion     Shoulder, Elbow, or Forearm Range of Motion Details: Shoulder elevation WNL without pain reported.          Shoulder Strength - Planes of Motion   Right Strength Right Pain Left Strength Left  Pain   Flexion 5   5     Extension           ABduction 5   5     ADduction           Horizontal ABduction           Horizontal ADduction           Internal Rotation 0° 5   5     Internal Rotation 90°           External Rotation 0° 5   5     External Rotation 90°               Elbow Strength   Right Strength Right Pain Left Strength Left  Pain   Flexion (C6) 5   5     Extension (C7) 5   5                       Intake Outcome Measure for FOTO Survey    Therapist reviewed FOTO scores for Columba Romano on 2/7/2025.   FOTO report - see Media section or FOTO account episode details.     Intake Score: 48%    Treatment:  therapeutic exercises to develop strength, endurance, ROM, and flexibility for 15 minutes including:  Supine chin tucks (2 x 10, 10")  Standing row (2 x 10, RTB)  Isometric cervical SB (2 x 10, YTB)    manual therapy techniques: Joint mobilizations, Manual traction, and Soft tissue Mobilization were applied to the: - for - minutes, including:  -    neuromuscular re-education activities to improve: Balance, Coordination, Kinesthetic, Sense, Proprioception, and Posture for - minutes. The following activities were included:  -    therapeutic activities to improve functional " performance for -  minutes, including:  -    gait training to improve functional mobility and safety for -  minutes, including:  -    Patient's spiritual, cultural, and educational needs considered and patient agreeable to plan of care and goals.     Assessment & Plan   Assessment  Columba presents with a condition of Moderate complexity.   Presentation of Symptoms: Changing  Will Comorbidities Impact Care: No       Functional Limitations: Activity tolerance, Carrying objects, Completing self-care activities, Disrupted sleep pattern, Functional mobility, Gait limitations, Manipulating objects, Pain when reaching, Range of motion, Proprioception  Impairments: Abnormal or restricted range of motion, Pain with functional activity    Prognosis: Good  Assessment Details: Pt presents with decreased pain associated with active cervical motion, decreased tolerance to functional activity, and mild dizziness reproduction with cervical movements. Pt's current symptoms limit their ability to efficiently and independently complete ADLs. Pt to benefit from skilled PT to address aforementioned deficits and promote optimal function.    Plan  From a physical therapy perspective, the patient would benefit from: Skilled Rehab Services    Planned therapy interventions include: Therapeutic exercise, Therapeutic activities, Neuromuscular re-education, Manual therapy, ADLs/IADLs, Aquatic therapy, Canalith repositioning, Cognitive functional training, Community/work reintegration, Gait training, and Orthotic management and training.    Planned modalities to include: Biofeedback, Cryotherapy (cold pack), Electrical stimulation - attended, Electrical stimulation - passive/unattended, Mechanical traction, Thermotherapy (hot pack), and Ultrasound.        Visit Frequency: 2 times Per Week for 6 Weeks.       This plan was discussed with Patient.   Discussion participants: Agreed Upon Plan of Care             Goals:   Active       Dizziness       Pt  will report decrease in dizziness reproduction when reaching to the backseat to talk with her daughter.        Start:  02/07/25    Expected End:  04/04/25               Functional outcome       Patient will show a significant change in FOTO (67%) patient-reported outcome tool to demonstrate subjective improvement       Start:  02/07/25    Expected End:  04/04/25            Patient will demonstrate independence in home program for support of progression       Start:  02/07/25    Expected End:  03/07/25               Pain       Patient will report pain of 1/10 at worst demonstrating a reduction of overall pain       Start:  02/07/25    Expected End:  04/04/25               Range of Motion       Patient will achieve bilateral cervical rotation ROM WNL without dizziness reproduction.        Start:  02/07/25    Expected End:  04/04/25            Patient will achieve cervical extension ROM WNL without dizziness reproduction.        Start:  02/07/25    Expected End:  04/04/25                Maddison Hudson, PT

## 2025-02-07 NOTE — TELEPHONE ENCOUNTER
----- Message from Flores sent at 2/6/2025  9:06 AM CST -----  Contact: Columba Garcia is calling in regards to going to ER for high BP and disc in neck needing a appt from a referral .please call back at .[phone]           Thanks  MALI

## 2025-02-10 ENCOUNTER — OFFICE VISIT (OUTPATIENT)
Dept: OTOLARYNGOLOGY | Facility: CLINIC | Age: 40
End: 2025-02-10
Payer: OTHER GOVERNMENT

## 2025-02-10 VITALS — BODY MASS INDEX: 29.39 KG/M2 | WEIGHT: 176.56 LBS

## 2025-02-10 DIAGNOSIS — M54.2 NECK PAIN: ICD-10-CM

## 2025-02-10 DIAGNOSIS — J30.89 NON-SEASONAL ALLERGIC RHINITIS, UNSPECIFIED TRIGGER: Primary | ICD-10-CM

## 2025-02-10 PROCEDURE — 99999 PR PBB SHADOW E&M-EST. PATIENT-LVL II: CPT | Mod: PBBFAC,,, | Performed by: OTOLARYNGOLOGY

## 2025-02-10 PROCEDURE — 99214 OFFICE O/P EST MOD 30 MIN: CPT | Mod: S$PBB,,, | Performed by: OTOLARYNGOLOGY

## 2025-02-10 PROCEDURE — 99212 OFFICE O/P EST SF 10 MIN: CPT | Mod: PBBFAC | Performed by: OTOLARYNGOLOGY

## 2025-02-10 NOTE — LETTER
February 10, 2025      [facility]  [address]  [place] [address]  Phone: [phone]  Fax: [phone]       Patient: Columba Romano   YOB: 1985  Date of Visit: 02/10/2025    To Whom It May Concern:    Beryl Romano  was at [facility] on 02/10/2025. The patient may return to work/school on 02/11/2025 with no restrictions. If you have any questions or concerns, or if I can be of further assistance, please do not hesitate to contact me.    Sincerely,    Ortiz Winston CMA

## 2025-02-10 NOTE — PROGRESS NOTES
Referring Provider:    No referring provider defined for this encounter.  Subjective:   Patient: Columba Romano [id number], :1985   Visit date:2/10/2025 12:52 PM    Chief Complaint:  Neck Pain (Pt is coming in today for neck and jaw pain pt states it feel like a striking tingling feeling this problem has been going on since last week pt states she is still congested but she is more concern about her jaw and neck  )    HPI:    Prior notes reviewed by myself.  Clinical documentation obtained by nursing staff reviewed.     39 y/o female here for evaluation of bilateral parotid swelling left greater than right.  She first noticed this over the weekend and then went to  on Monday at which time she was given a round of amoxicillin, steroids.  She feels that it has fluctuated in size since that time and has not resolved.  She has also been trying some sialogogues.      23 update:  Her parotid swelling has essentially resolved, here today for re-evaluation and review of her labwork.    2/10/25 update:   she recently had an episode where she choked on some food.  She had some fairly aggressive  vomiting/ gagging after this episode and felt a pop in her left neck.  She had a workup in the emergency department including CT angiogram which did not demonstrate any abnormalities.  She continues to have pain particularly along the left neck and this is exacerbated with certain head movements and positions      Objective:     Physical Exam:  Vitals:  Wt 80.1 kg (176 lb 9.4 oz)   LMP 2025 (Exact Date)   BMI 29.39 kg/m²   General appearance:  Well developed, well nourished    Ears:  Otoscopy of external auditory canals and tympanic membranes was normal, clinical speech reception thresholds grossly intact, no mass/lesion of auricle.    Nose:  No masses/lesions of external nose, nasal mucosa, septum, and turbinates were within normal limits.    Mouth:  No mass/lesion of lips, teeth, gums, hard/soft palate, tongue,  tonsils, or oropharynx. Thick but not purulent saliva left parotid duct    Neck & Lymphatics:  No cervical lymphadenopathy, normal salivary glands, trachea is midline, no thyroid enlargement/tenderness/mass.  TTP over left SCM and left carotid bulb        [x]  Data Reviewed:    Lab Results   Component Value Date    WBC 11.04 02/05/2025    HGB 13.4 02/05/2025    HCT 41.1 02/05/2025    MCV 90 02/05/2025    EOSINOPHIL 1.6 02/05/2025        Results    Collected Updated Procedure    09/21/2023 1320 09/22/2023 1722 VINOD Pattern 1 [[id number]] Component Value   VINOD PATTERN 1 Homogeneous          09/21/2023 1320 09/25/2023 1603 VINOD Profile [[id number]] Component Value Units   Anti Sm Antibody 0.08 Ratio   Anti-Sm Interpretation Negative    Anti-SSA Antibody 0.09 Ratio   Anti-SSA Interpretation Negative    Anti-SSB Antibody 0.16 Ratio   Anti-SSB Interpretation Negative    ds DNA Ab Negative 1:10     Anti Sm/RNP Antibody 0.09 Ratio   Anti-Sm/RNP Interpretation Negative           09/21/2023 1320 09/22/2023 1722 VINOD Titer 1 [[id number]] Component Value   VINOD Titer 1 1:160          09/21/2023 1320 09/22/2023 0026 CBC auto differential [[id number]]   (Abnormal)   Blood    Component Value Units   WBC 11.33 K/uL   RBC 4.12 M/uL   Hemoglobin 12.2 g/dL   Hematocrit 39.1 %   MCV 95 fL   MCH 29.6 pg   MCHC 31.2 Low  g/dL   RDW 12.8 %   Platelets 243 K/uL   MPV 10.7 fL   Immature Granulocytes 1.5 High  %   Gran # (ANC) 8.8 High  K/uL   Immature Grans (Abs) 0.17 High   K/uL   Lymph # 1.6 K/uL   Mono # 0.6 K/uL   Eos # 0.1 K/uL   Baso # 0.08 K/uL   nRBC 0 /100 WBC   Gran % 77.2 High  %   Lymph % 14.3 Low  %   Mono % 5.2 %   Eosinophil % 1.1 %   Basophil % 0.7 %   Differential Method Automated           09/21/2023 1320 09/21/2023 2229 Comprehensive metabolic panel [[id number]]   (Abnormal)   Blood    Component Value Units   Sodium 136 mmol/L   Potassium 4.0 mmol/L   Chloride 106 mmol/L   CO2 22 Low  mmol/L   Glucose 98 mg/dL   BUN 13 mg/dL    Creatinine 0.7 mg/dL   Calcium 9.3 mg/dL   Total Protein 6.7 g/dL   Albumin 3.6 g/dL   Total Bilirubin 0.1  mg/dL   Alkaline Phosphatase 82 U/L   AST 54 High  U/L   ALT 72 High  U/L   eGFR >60.0 mL/min/1.73 m^2   Anion Gap 8 mmol/L          09/21/2023 1320 09/21/2023 1507 Sedimentation rate [[id number]]   Blood    Component Value Units   Sed Rate 10 mm/Hr          09/21/2023 1320 09/21/2023 2229 C-reactive protein [[id number]]   Blood    Component Value Units   CRP 1.6 mg/L          09/21/2023 1320 09/22/2023 1721 VINOD [[id number]]   (Abnormal)   Blood    Component Value   VINOD Screen Positive Abnormal            09/21/2023 1320 09/22/2023 1812 Rheumatoid factor [[id number]]   Blood    Component Value Units   Rheumatoid Factor <13.0 IU/mL          09/21/2023 1320 09/25/2023 1603 Sjogrens syndrome-A extractable nuclear antibody [[id number]]    Blood    Component Value Units   Anti-SSA Antibody 0.09 Ratio   Anti-SSA Interpretation Negative             INDEPENDENT REVIEW:  no abnormalities noted left neck    CTA Neck  Order: [id number]  Status: Final result       Visible to patient: Yes (seen)       Next appt: 02/12/2025 at 11:20 AM in Internal Medicine (Joan Forrester MD)    0 Result Notes  Details    Reading Physician Reading Date Result Priority   Jacky Mcdowell MD  [phone]  [phone] 2/6/2025 STAT     Narrative & Impression  EXAMINATION:  CTA NECK     CLINICAL HISTORY:  Carotid artery dissection;Vertebral artery dissection suspected;     TECHNIQUE:  Standard contrast enhanced CTA of neck with 100 mL Omnipaque 350 contrast with 3D MIP reformations.     COMPARISON:  None     FINDINGS:  CTA neck: The ascending aorta is 3.6 cm.  The descending aorta is 1.8 cm.  The aortic arch demonstrates a normal 3 vessel appearance.     The common carotid arteries appear normal.     The right and left CCA bifurcations are normal.     The internal carotid arteries are normal.     The vertebral arteries are patent.      No additional soft tissue abnormality.     Impression:     No acute abnormality.  Mild ectasia of the ascending aorta at 3.6 cm.     All CT scans at this facility use dose modulation, iterative reconstruction and/or weight based dosing when appropriate to reduce radiation dose to as low as reasonably achievable.        Electronically signed by:Jacky Mcdowell MD  Date:                                            02/06/2025  Time:                                           07:48        Exam Ended: 02/06/25 00:16 CST Last Resulted: 02/06/25 07:48 CST       Order Details        View Encounter        Lab and Collection Details        Routing        Result History     View All Conversations on this Encounter           Assessment & Plan:   Non-seasonal allergic rhinitis, unspecified trigger    Neck pain            Her left parotid swelling seems to be improving on the amoxicillin.  She also has some right parotid swelling.  This is moderately tender.  We discussed the common causes of sialoadenitis.  I explained that it is uncommon have it bilaterally.  She does have some family history of autoimmune issues, so we agreed on some lab work to rule out other possible causes of salivary gland dysfunction/swelling.  She is going to continue with her antibiotics and also start with massage, warm compresses and increased hydration.  She will follow-up in 2 weeks.    9/27/23 update:  Her previously noted symptoms have improved.  We reviewed her lab work which did demonstrate a positive VINOD and elevated liver function tests. Other autoimmune screening tests were negative.    She recently stopped drinking alcohol and she feels that this likely was the reason behind the increased liver function tests.  She had a recent ER visit for an unrelated reason which did already show improvement in her LFTs.  She does have chronic sinonasal allergy symptoms including congestion, rhinorrhea, postnasal drip.  I encouraged her to start Flonase  on a daily basis.  She will follow-up for re-evaluation in 6 months.    2/10/25 update:   we reviewed her recent history and imaging as well as her physical exam.  She continues to have significant tenderness along her sternocleidomastoid muscle and also over her left carotid artery.  She has an upcoming appointment with her cardiologist, so I encouraged her to discuss the carotid artery tenderness with them.   She may have strained her sternocleidomastoid muscle during this vomiting episode.  We discussed conservative measures for this including massage, NSAIDs, stretching.  We also discussed the option for a PT referral.  She will consider this option and discuss with her current physical therapist who is managing her other neck pain.

## 2025-02-13 ENCOUNTER — OFFICE VISIT (OUTPATIENT)
Dept: INTERNAL MEDICINE | Facility: CLINIC | Age: 40
End: 2025-02-13
Payer: OTHER GOVERNMENT

## 2025-02-13 ENCOUNTER — CLINICAL SUPPORT (OUTPATIENT)
Dept: REHABILITATION | Facility: HOSPITAL | Age: 40
End: 2025-02-13
Payer: OTHER GOVERNMENT

## 2025-02-13 VITALS
DIASTOLIC BLOOD PRESSURE: 82 MMHG | HEIGHT: 65 IN | TEMPERATURE: 97 F | OXYGEN SATURATION: 99 % | BODY MASS INDEX: 29.75 KG/M2 | HEART RATE: 102 BPM | WEIGHT: 178.56 LBS | SYSTOLIC BLOOD PRESSURE: 116 MMHG

## 2025-02-13 VITALS — SYSTOLIC BLOOD PRESSURE: 107 MMHG | DIASTOLIC BLOOD PRESSURE: 81 MMHG | HEART RATE: 77 BPM

## 2025-02-13 DIAGNOSIS — I77.819 AORTIC ECTASIA: ICD-10-CM

## 2025-02-13 DIAGNOSIS — Q23.81 BICUSPID AORTIC VALVE: ICD-10-CM

## 2025-02-13 DIAGNOSIS — M54.2 NECK PAIN: Primary | ICD-10-CM

## 2025-02-13 DIAGNOSIS — R20.2 PARESTHESIA OF SKIN: ICD-10-CM

## 2025-02-13 DIAGNOSIS — M50.30 DDD (DEGENERATIVE DISC DISEASE), CERVICAL: Primary | ICD-10-CM

## 2025-02-13 PROBLEM — E66.811 OBESITY (BMI 30.0-34.9): Status: RESOLVED | Noted: 2021-12-20 | Resolved: 2025-02-13

## 2025-02-13 PROCEDURE — 97140 MANUAL THERAPY 1/> REGIONS: CPT | Mod: PN

## 2025-02-13 PROCEDURE — 97112 NEUROMUSCULAR REEDUCATION: CPT | Mod: PN

## 2025-02-13 PROCEDURE — 99213 OFFICE O/P EST LOW 20 MIN: CPT | Mod: S$PBB,,, | Performed by: FAMILY MEDICINE

## 2025-02-13 PROCEDURE — 99999 PR PBB SHADOW E&M-EST. PATIENT-LVL IV: CPT | Mod: PBBFAC,,, | Performed by: FAMILY MEDICINE

## 2025-02-13 PROCEDURE — 99214 OFFICE O/P EST MOD 30 MIN: CPT | Mod: PBBFAC | Performed by: FAMILY MEDICINE

## 2025-02-13 PROCEDURE — 97110 THERAPEUTIC EXERCISES: CPT | Mod: PN

## 2025-02-13 NOTE — PROGRESS NOTES
Subjective:       Patient ID: Columba Romano is a 39 y.o. female presents with   Patient Active Problem List   Diagnosis    Gastroesophageal reflux disease    Post-cholecystectomy syndrome    Bicuspid aortic valve    Aortic valve regurgitation    DDD (degenerative disc disease), cervical    Fibrocystic disease of breast    Migraine with aura    Seasonal allergic rhinitis    Simple renal cyst    Neck pain    Paresthesia of skin        Chief Complaint: Follow-up    History of Present Illness    Columba presents today for follow-up on multiple health concerns secondary to recent ER visit.  She experienced a choking episode while eating that led to forceful vomiting. During this event, she felt something pop in her neck followed by lightheadedness and dizziness. Her self-monitored blood pressure was 166/111 with pulse in the 160s. CTA at [facility] on [address] showed no arterial abnormalities. She has a known bicuspid aortic valve with mild stenosis and leakage. CT showed ascending aorta measurement of 3.6, though cardiologist reports 3.9, being monitored for potential aneurysm development. She was recently diagnosed with SVT following cardiac monitoring, with an episode occurring prior to starting antibiotics and steroids. She reports numbness in one finger for three months involving the entire digit, with a second previously affected finger showing improvement with minimal residual symptoms. She also experiences left knee pain when bending and turning, particularly when getting up from the couch. The knee pain originated from a running injury involving a sudden stop, which initially caused her to hobble for approximately 2.5 weeks and continues to persist. CO2 levels are 25 (normal range 21-32). LDL cholesterol is slightly elevated with goal below 100. HDL cholesterol is 51, above desired level of 40. Thyroid and Vitamin D levels are good. During ER visit, urinalysis showed trace amounts of blood in urine.  She has  "significant family history of heart disease on paternal side, with multiple family members experiencing heart attacks. She recently quit smoking.  Patient Has appointment with neurosurgeon as well, not interested in getting any surgery done and is currently doing physical therapy weekly which has been helpful with the neck pain.       ROS:  General: -fever, -chills, -fatigue, -weight gain, -weight loss, -loss of appetite  Eyes: -vision changes, -blurry vision, -eye pain, -eye discharge  ENT: -ear pain, -hearing loss, -tinnitus, -nasal congestion, -sore throat  Cardiovascular: -chest pain, -palpitations, -lower extremity edema  Respiratory: -cough, -shortness of breath, -wheezing, -sputum production  Endocrine: -polyuria, -polydipsia, -heat intolerance, -cold intolerance  Gastrointestinal: -abdominal pain, -heartburn, -nausea, -vomiting, -diarrhea, -constipation, -blood in stool  Genitourinary: -dysuria, -urgency, -frequency, -hematuria, -nocturia, -incontinence, +urine changes  Heme & Lymphatic: -easy or excessive bleeding, -easy bruising, -swollen lymph nodes  Musculoskeletal: -muscle pain, -back pain, +joint pain, -joint swelling, + neck pain  Skin: -rash, -lesion, -itching, -skin texture changes, -skin color changes  Neurological: -headache, +dizziness, +numbness, +tingling, -seizure activity, -speech difficulty, -memory loss, -confusion  Psychiatric: -anxiety, -depression, -sleep difficulty                /82 (BP Location: Right arm, Patient Position: Sitting)   Pulse 102   Temp 97.3 °F (36.3 °C) (Tympanic)   Ht 5' 5" (1.651 m)   Wt 81 kg (178 lb 9.2 oz)   LMP 01/08/2025 (Exact Date)   SpO2 99%   BMI 29.72 kg/m²   Objective:      Physical Exam  Constitutional:       Appearance: She is well-developed.   HENT:      Head: Normocephalic and atraumatic.   Cardiovascular:      Rate and Rhythm: Normal rate and regular rhythm.      Heart sounds: Normal heart sounds. No murmur heard.  Pulmonary:      Effort: " Pulmonary effort is normal.      Breath sounds: Normal breath sounds. No wheezing.   Abdominal:      General: Bowel sounds are normal.      Palpations: Abdomen is soft.      Tenderness: There is no abdominal tenderness.   Musculoskeletal:         General: No tenderness.      Comments: No significant tenderness noted to the paraspinal cervical muscles   Skin:     General: Skin is warm and dry.      Findings: No rash.   Neurological:      Mental Status: She is alert and oriented to person, place, and time.   Psychiatric:         Mood and Affect: Mood normal.           Assessment/Plan:   1. DDD (degenerative disc disease), cervical  Overview:  Note: by MRI 6/17  Continue physical therapy and follow-up with neurosurgeon as recommended by ER physician  Patient clinically doing well and has naproxen and Norflex on board as needed    2. Aortic ectasia  3. Bicuspid aortic valve  Patient currently closely followed by Cardiology    If having any episode of extreme elevations and vital signs including blood pressure heart rate, patient to discuss further with Cardiology  Reviewed recent labs which looks stable including CTA chest showing aortic ectasia of 3.6 cm           This note was generated with the assistance of ambient listening technology. Verbal consent was obtained by the patient and accompanying visitor(s) for the recording of patient appointment to facilitate this note. I attest to having reviewed and edited the generated note for accuracy, though some syntax or spelling errors may persist. Please contact the author of this note for any clarification.

## 2025-02-13 NOTE — PROGRESS NOTES
"  Outpatient Rehab    Physical Therapy Visit    Patient Name: Columba Romano  MRN: [id number]  YOB: 1985  Today's Date: 2/13/2025    Therapy Diagnosis: No diagnosis found.  Physician: Joan Forrester MD    Physician Orders: Eval and Treat  Medical Diagnosis:   M50.30 (ICD-10-CM) - DDD (degenerative disc disease), cervical   M25.562 (ICD-10-CM) - Left anterior knee pain         Visit # / Visits Authorized:  1 / 20 + eval  Date of Evaluation:  2/7/2025   Insurance Authorization Period: 1/29/25 to 12/20/25  Plan of Care Certification:  2/7/2025 to 3/21/25     Time In: 2:30 PM  Time Out: 3:30 PM  Total Time: 60 minutes   Total Billable Time: 60 minutes    FOTO:  Intake Score:  %  Survey Score 1:  %  Survey Score 2:  %         Subjective   Feeling a little better. BP has remained lower over the past few days, more similar to normal values..         Objective   Vital Signs  /81   Pulse 77   LMP 01/08/2025 (Exact Date)                         Treatment:  therapeutic exercises to develop strength, endurance, ROM, and flexibility for 15 minutes including:  UBE (3/3)  Open books (10 x, 10 c YTB)  Standing row (3 x 10, GTB)     manual therapy techniques: Joint mobilizations, Manual traction, and Soft tissue Mobilization were applied to the: cervical spine for 15 minutes, including:  STM to B UT  Suboccipital release with distraction     neuromuscular re-education activities to improve: Balance, Coordination, Kinesthetic, Sense, Proprioception, and Posture for 30 minutes. The following activities were included:  Supine chin tucks (2 x 10, 10")  Supine chin tuck c head lift (2 x 10)  Isometric cervical SB (3 x 10, YTB)  Head turning with gaze focus (10 x each direction)     therapeutic activities to improve functional performance for - minutes, including:  -     gait training to improve functional mobility and safety for - minutes, including:  -    Assessment & Plan   Assessment: Pt demonstrates independence " c HEP previously provided. Pt was able to tolerate cervical movements but does report mild dizziness/floating head symptoms. Pt and PT discussed incorporating these head movements into functional movements as they become less symptom provoking.       Patient will continue to benefit from skilled outpatient physical therapy to address the deficits listed in the problem list box on initial evaluation, provide pt/family education and to maximize pt's level of independence in the home and community environment.     Patient's spiritual, cultural, and educational needs considered and patient agreeable to plan of care and goals.           Plan: Planned therapy interventions include: Therapeutic exercise, Therapeutic activities, Neuromuscular re-education, Manual therapy, ADLs/IADLs, Aquatic therapy, Canalith repositioning, Cognitive functional training, Community/work reintegration, Gait training, and Orthotic management and training.    Planned modalities to include: Biofeedback, Cryotherapy (cold pack), Electrical stimulation - attended, Electrical stimulation - passive/unattended, Mechanical traction, Thermotherapy (hot pack), and Ultrasound.         Visit Frequency: 2 times Per Week for 6 Weeks.    Goals:   Active       Dizziness       Pt will report decrease in dizziness reproduction when reaching to the backseat to talk with her daughter.  (Progressing)       Start:  02/07/25    Expected End:  04/04/25               Functional outcome       Patient will show a significant change in FOTO (67%) patient-reported outcome tool to demonstrate subjective improvement (Progressing)       Start:  02/07/25    Expected End:  04/04/25            Patient will demonstrate independence in home program for support of progression (Progressing)       Start:  02/07/25    Expected End:  03/07/25               Pain       Patient will report pain of 1/10 at worst demonstrating a reduction of overall pain (Progressing)       Start:   02/07/25    Expected End:  04/04/25               Range of Motion       Patient will achieve bilateral cervical rotation ROM WNL without dizziness reproduction.  (Progressing)       Start:  02/07/25    Expected End:  04/04/25            Patient will achieve cervical extension ROM WNL without dizziness reproduction.  (Progressing)       Start:  02/07/25    Expected End:  04/04/25                Maddison Hudson, PT

## 2025-02-17 ENCOUNTER — CLINICAL SUPPORT (OUTPATIENT)
Dept: REHABILITATION | Facility: HOSPITAL | Age: 40
End: 2025-02-17
Payer: OTHER GOVERNMENT

## 2025-02-17 DIAGNOSIS — M54.2 NECK PAIN: Primary | ICD-10-CM

## 2025-02-17 DIAGNOSIS — R20.2 PARESTHESIA OF SKIN: ICD-10-CM

## 2025-02-17 PROCEDURE — 97110 THERAPEUTIC EXERCISES: CPT | Mod: PN

## 2025-02-17 PROCEDURE — 97112 NEUROMUSCULAR REEDUCATION: CPT | Mod: PN

## 2025-02-17 NOTE — PROGRESS NOTES
"  Outpatient Rehab    Physical Therapy Visit    Patient Name: Columba Romano  MRN: [id number]  YOB: 1985  Today's Date: 2/17/2025    Therapy Diagnosis:   Encounter Diagnoses   Name Primary?    Neck pain Yes    Paresthesia of skin      Physician: Joan Forrester MD    Physician Orders: Eval and Treat  Medical Diagnosis:   M50.30 (ICD-10-CM) - DDD (degenerative disc disease), cervical   M25.562 (ICD-10-CM) - Left anterior knee pain         Visit # / Visits Authorized:  1 / 20 + eval  Date of Evaluation:  2/7/2025   Insurance Authorization Period: 1/29/25 to 12/20/25  Plan of Care Certification:  2/7/2025 to 3/21/25     Time In: 2:09 PM  Time Out: 3:00 PM  Total Time: 51 minutes   Total Billable Time: 51 minutes    FOTO:  Intake Score:  %  Survey Score 1:  %  Survey Score 2:  %         Subjective   UE symptoms have been a lot better. Cont's to have neck pain and dizziness though..         Objective              Treatment:  therapeutic exercises to develop strength, endurance, ROM, and flexibility for 18 minutes including:  UBE (3/3)  Open books (10 x, 10 c YTB)  Standing row (3 x 10, GTB)  UT stretch (10 x 10")     manual therapy techniques: Joint mobilizations, Manual traction, and Soft tissue Mobilization were applied to the: cervical spine for - minutes, including:     neuromuscular re-education activities to improve: Balance, Coordination, Kinesthetic, Sense, Proprioception, and Posture for 33 minutes. The following activities were included:  Supine chin tucks (10 x 10")  Supine chin tuck c head lift (10 x)  QP chin tuck (2 x 10, 10")  Isometric cervical SB (3 x 10, YTB)  Head turn with target (10 x)  Cervical extension with target (10 x)     therapeutic activities to improve functional performance for - minutes, including:  -     gait training to improve functional mobility and safety for - minutes, including:  -    Assessment & Plan   Assessment: Pt demonstrates good tolerance to advancement in " exercise today. Pt tolerates chin tucks performed in multiple positions today though cont's to report fatigue with cervical exercises. Pt tolerated head movements with vision targets better than head turning with gaze stabilization.       Patient will continue to benefit from skilled outpatient physical therapy to address the deficits listed in the problem list box on initial evaluation, provide pt/family education and to maximize pt's level of independence in the home and community environment.     Patient's spiritual, cultural, and educational needs considered and patient agreeable to plan of care and goals.           Plan: Planned therapy interventions include: Therapeutic exercise, Therapeutic activities, Neuromuscular re-education, Manual therapy, ADLs/IADLs, Aquatic therapy, Canalith repositioning, Cognitive functional training, Community/work reintegration, Gait training, and Orthotic management and training.    Planned modalities to include: Biofeedback, Cryotherapy (cold pack), Electrical stimulation - attended, Electrical stimulation - passive/unattended, Mechanical traction, Thermotherapy (hot pack), and Ultrasound.         Visit Frequency: 2 times Per Week for 6 Weeks.    Goals:   Active       Dizziness       Pt will report decrease in dizziness reproduction when reaching to the backseat to talk with her daughter.  (Progressing)       Start:  02/07/25    Expected End:  04/04/25               Functional outcome       Patient will show a significant change in FOTO (67%) patient-reported outcome tool to demonstrate subjective improvement (Progressing)       Start:  02/07/25    Expected End:  04/04/25            Patient will demonstrate independence in home program for support of progression (Progressing)       Start:  02/07/25    Expected End:  03/07/25               Pain       Patient will report pain of 1/10 at worst demonstrating a reduction of overall pain (Progressing)       Start:  02/07/25     Expected End:  04/04/25               Range of Motion       Patient will achieve bilateral cervical rotation ROM WNL without dizziness reproduction.  (Progressing)       Start:  02/07/25    Expected End:  04/04/25            Patient will achieve cervical extension ROM WNL without dizziness reproduction.  (Progressing)       Start:  02/07/25    Expected End:  04/04/25                Maddison Hudson, PT

## 2025-02-25 ENCOUNTER — CLINICAL SUPPORT (OUTPATIENT)
Dept: REHABILITATION | Facility: HOSPITAL | Age: 40
End: 2025-02-25
Payer: OTHER GOVERNMENT

## 2025-02-25 DIAGNOSIS — R20.2 PARESTHESIA OF SKIN: ICD-10-CM

## 2025-02-25 DIAGNOSIS — M54.2 NECK PAIN: Primary | ICD-10-CM

## 2025-02-25 PROCEDURE — 97112 NEUROMUSCULAR REEDUCATION: CPT | Mod: PN

## 2025-02-25 PROCEDURE — 97110 THERAPEUTIC EXERCISES: CPT | Mod: PN

## 2025-02-25 PROCEDURE — 97140 MANUAL THERAPY 1/> REGIONS: CPT | Mod: PN

## 2025-02-25 NOTE — PROGRESS NOTES
"  Outpatient Rehab    Physical Therapy Visit    Patient Name: Columba Romano  MRN: [id number]  YOB: 1985  Today's Date: 2/25/2025    Therapy Diagnosis:   Encounter Diagnoses   Name Primary?    Neck pain Yes    Paresthesia of skin      Physician: Joan Forrester MD    Physician Orders: Eval and Treat  Medical Diagnosis:   M50.30 (ICD-10-CM) - DDD (degenerative disc disease), cervical   M25.562 (ICD-10-CM) - Left anterior knee pain         Visit # / Visits Authorized:  1 / 20 + eval  Date of Evaluation:  2/7/2025   Insurance Authorization Period: 1/29/25 to 12/20/25  Plan of Care Certification:  2/7/2025 to 3/21/25     Time In: 2:15 PM  Time Out: 3:15 PM  Total Time: 60 minutes   Total Billable Time: 60 minutes    FOTO:  Intake Score:  %  Survey Score 1:  %  Survey Score 2:  %         Subjective   Feeling a lot better. No symptoms in the hand or finger. Minimal dizziness..         Objective              therapeutic exercises to develop strength, endurance, ROM, and flexibility for 15 minutes including:  UBE (3/3)  Open books (2 x 10, 10 c YTB)  Standing row (3 x 10, GTB)     manual therapy techniques: Joint mobilizations, Manual traction, and Soft tissue Mobilization were applied to the: cervical spine for 15 minutes, including:  Prone B UPAs to C1-C2, and thoracic spine     neuromuscular re-education activities to improve: Balance, Coordination, Kinesthetic, Sense, Proprioception, and Posture for 20 minutes. The following activities were included:  Isometric cervical flexion (2 x 10, 10")  Isometric cervical extension (2 x 10, 10") - mild dizziness reproduction  Isometric cervical SB (3 x 10, YTB)  Supine chin tucks (10 x 10")  Supine chin tuck c head lift (2 x 10)  QP chin tuck (2 x 10, 10")  B ER (2 x 10)     therapeutic activities to improve functional performance for - minutes, including:  -     gait training to improve functional mobility and safety for - minutes, including:  -     MHP to the " cervical spine for 10' following treatment.     Assessment & Plan   Assessment: Pt tolerates advancement in today's treatment well. Pt participated in new HEP provided today, with primary focus on cervical muscle facilitation. Pt cont's to report mild dizziness with exercise performance.       Patient will continue to benefit from skilled outpatient physical therapy to address the deficits listed in the problem list box on initial evaluation, provide pt/family education and to maximize pt's level of independence in the home and community environment.     Patient's spiritual, cultural, and educational needs considered and patient agreeable to plan of care and goals.           Plan: Planned therapy interventions include: Therapeutic exercise, Therapeutic activities, Neuromuscular re-education, Manual therapy, ADLs/IADLs, Aquatic therapy, Canalith repositioning, Cognitive functional training, Community/work reintegration, Gait training, and Orthotic management and training.    Planned modalities to include: Biofeedback, Cryotherapy (cold pack), Electrical stimulation - attended, Electrical stimulation - passive/unattended, Mechanical traction, Thermotherapy (hot pack), and Ultrasound.         Visit Frequency: 2 times Per Week for 6 Weeks.    Goals:   Active       Dizziness       Pt will report decrease in dizziness reproduction when reaching to the backseat to talk with her daughter.  (Progressing)       Start:  02/07/25    Expected End:  04/04/25               Functional outcome       Patient will show a significant change in FOTO (67%) patient-reported outcome tool to demonstrate subjective improvement (Progressing)       Start:  02/07/25    Expected End:  04/04/25            Patient will demonstrate independence in home program for support of progression (Progressing)       Start:  02/07/25    Expected End:  03/07/25               Pain       Patient will report pain of 1/10 at worst demonstrating a reduction of  overall pain (Progressing)       Start:  02/07/25    Expected End:  04/04/25               Range of Motion       Patient will achieve bilateral cervical rotation ROM WNL without dizziness reproduction.  (Progressing)       Start:  02/07/25    Expected End:  04/04/25            Patient will achieve cervical extension ROM WNL without dizziness reproduction.  (Progressing)       Start:  02/07/25    Expected End:  04/04/25                Maddison Hudson, PT

## 2025-03-21 ENCOUNTER — PATIENT MESSAGE (OUTPATIENT)
Dept: INTERNAL MEDICINE | Facility: CLINIC | Age: 40
End: 2025-03-21
Payer: OTHER GOVERNMENT